# Patient Record
Sex: FEMALE | Race: WHITE | NOT HISPANIC OR LATINO | URBAN - METROPOLITAN AREA
[De-identification: names, ages, dates, MRNs, and addresses within clinical notes are randomized per-mention and may not be internally consistent; named-entity substitution may affect disease eponyms.]

---

## 2023-06-28 ENCOUNTER — HOSPITAL ENCOUNTER (INPATIENT)
Dept: DATA CONVERSION | Facility: HOSPITAL | Age: 63
Discharge: HOME | End: 2023-07-01
Attending: INTERNAL MEDICINE | Admitting: INTERNAL MEDICINE
Payer: COMMERCIAL

## 2023-06-28 DIAGNOSIS — N13.2 HYDRONEPHROSIS WITH RENAL AND URETERAL CALCULOUS OBSTRUCTION: Primary | ICD-10-CM

## 2023-06-28 LAB
ALBUMIN SERPL-MCNC: 4.3 GM/DL (ref 3.5–5)
ALBUMIN/GLOB SERPL: 1.4 RATIO (ref 1.5–3)
ALP BLD-CCNC: 90 U/L (ref 35–125)
ALT SERPL-CCNC: 19 U/L (ref 5–40)
ANION GAP SERPL CALCULATED.3IONS-SCNC: 13 MMOL/L (ref 0–19)
ANTICOAGULANT: NORMAL
APTT PPP: 25.2 SEC (ref 22–32.5)
APTT PPP: 26 SEC (ref 22–32.5)
AST SERPL-CCNC: 37 U/L (ref 5–40)
BASOPHILS # BLD AUTO: 0.05 K/UL (ref 0–0.22)
BASOPHILS NFR BLD AUTO: 0.4 % (ref 0–1)
BILIRUB SERPL-MCNC: 0.5 MG/DL (ref 0.1–1.2)
BUN SERPL-MCNC: 20 MG/DL (ref 8–25)
BUN/CREAT SERPL: 18.2 RATIO (ref 8–21)
CALCIUM SERPL-MCNC: 9.3 MG/DL (ref 8.5–10.4)
CHLORIDE SERPL-SCNC: 103 MMOL/L (ref 97–107)
CK SERPL-CCNC: 81 U/L (ref 24–195)
CO2 SERPL-SCNC: 22 MMOL/L (ref 24–31)
CREAT SERPL-MCNC: 1.1 MG/DL (ref 0.4–1.6)
DEPRECATED RDW RBC AUTO: 44.4 FL (ref 37–54)
DIFFERENTIAL METHOD BLD: ABNORMAL
EOSINOPHIL # BLD AUTO: 0.05 K/UL (ref 0–0.45)
EOSINOPHIL NFR BLD: 0.4 % (ref 0–3)
ERYTHROCYTE [DISTWIDTH] IN BLOOD BY AUTOMATED COUNT: 13.4 % (ref 11.7–15)
ETHANOL SERPL-MCNC: <0.01 GM/DL (ref 0–0.01)
GFR SERPL CREATININE-BSD FRML MDRD: 56 ML/MIN/1.73 M2
GLOBULIN SER-MCNC: 3 G/DL (ref 1.9–3.7)
GLUCOSE SERPL-MCNC: 192 MG/DL (ref 65–99)
HCT VFR BLD AUTO: 39.7 % (ref 36–44)
HGB BLD-MCNC: 13.5 GM/DL (ref 12–15)
HS TROPONIN T DELTA: 1 (ref 0–4)
HS TROPONIN T DELTA: NORMAL (ref 0–4)
IMM GRANULOCYTES # BLD AUTO: 0.06 K/UL (ref 0–0.1)
INR PPP: 1 (ref 0.86–1.16)
INR PPP: 1 (ref 0.86–1.16)
LIPASE SERPL-CCNC: 43 U/L (ref 16–63)
LYMPHOCYTES # BLD AUTO: 1.18 K/UL (ref 1.2–3.2)
LYMPHOCYTES NFR BLD MANUAL: 10 % (ref 20–40)
MCH RBC QN AUTO: 30.8 PG (ref 26–34)
MCHC RBC AUTO-ENTMCNC: 34 % (ref 31–37)
MCV RBC AUTO: 90.4 FL (ref 80–100)
MONOCYTES # BLD AUTO: 0.56 K/UL (ref 0–0.8)
MONOCYTES NFR BLD MANUAL: 4.8 % (ref 0–8)
NEUTROPHILS # BLD AUTO: 9.88 K/UL
NEUTROPHILS # BLD AUTO: 9.88 K/UL (ref 1.8–7.7)
NEUTROPHILS.IMMATURE NFR BLD: 0.5 % (ref 0–1)
NEUTS SEG NFR BLD: 83.9 % (ref 50–70)
NRBC BLD-RTO: 0 /100 WBC
NT-PROBNP SERPL-MCNC: 1339 PG/ML (ref 0–226)
PLATELET # BLD AUTO: 254 K/UL (ref 150–450)
PMV BLD AUTO: 10.3 CU (ref 7–12.6)
POTASSIUM SERPL-SCNC: 4.1 MMOL/L (ref 3.4–5.1)
PROT SERPL-MCNC: 7.3 G/DL (ref 5.9–7.9)
PROTHROMBIN TIME: 10.3 SEC (ref 9.3–12.7)
PROTHROMBIN TIME: 10.5 SEC (ref 9.3–12.7)
RBC # BLD AUTO: 4.39 M/UL (ref 4–4.9)
SODIUM SERPL-SCNC: 138 MMOL/L (ref 133–145)
TROPONIN T SERPL-MCNC: 11 NG/L
TROPONIN T SERPL-MCNC: 12 NG/L
WBC # BLD AUTO: 11.8 K/UL (ref 4.5–11)

## 2023-06-28 PROCEDURE — 99222 1ST HOSP IP/OBS MODERATE 55: CPT | Performed by: INTERNAL MEDICINE

## 2023-06-29 LAB
ANION GAP SERPL CALCULATED.3IONS-SCNC: 15 MMOL/L (ref 0–19)
APPEARANCE PLAS: CLEAR
BASOPHILS # BLD AUTO: 0.03 K/UL (ref 0–0.22)
BASOPHILS NFR BLD AUTO: 0.5 % (ref 0–1)
BUN SERPL-MCNC: 19 MG/DL (ref 8–25)
BUN/CREAT SERPL: 13.6 RATIO (ref 8–21)
CALCIUM SERPL-MCNC: 8.1 MG/DL (ref 8.5–10.4)
CHLORIDE SERPL-SCNC: 110 MMOL/L (ref 97–107)
CHOLEST SERPL-MCNC: 163 MG/DL (ref 133–200)
CHOLEST/HDLC SERPL: 4 RATIO
CO2 SERPL-SCNC: 19 MMOL/L (ref 24–31)
COLOR SPUN FLD: YELLOW
CREAT SERPL-MCNC: 1.4 MG/DL (ref 0.4–1.6)
DEPRECATED RDW RBC AUTO: 46.1 FL (ref 37–54)
DIFFERENTIAL METHOD BLD: ABNORMAL
EOSINOPHIL # BLD AUTO: 0.09 K/UL (ref 0–0.45)
EOSINOPHIL NFR BLD: 1.4 % (ref 0–3)
ERYTHROCYTE [DISTWIDTH] IN BLOOD BY AUTOMATED COUNT: 13.6 % (ref 11.7–15)
FASTING STATUS PATIENT QL REPORTED: ABNORMAL
GFR SERPL CREATININE-BSD FRML MDRD: 42 ML/MIN/1.73 M2
GLUCOSE SERPL-MCNC: 89 MG/DL (ref 65–99)
HCT VFR BLD AUTO: 39.5 % (ref 36–44)
HDLC SERPL-MCNC: 41 MG/DL
HGB BLD-MCNC: 12.6 GM/DL (ref 12–15)
IMM GRANULOCYTES # BLD AUTO: 0.03 K/UL (ref 0–0.1)
LDLC SERPL CALC-MCNC: 94 MG/DL (ref 65–130)
LYMPHOCYTES # BLD AUTO: 1.19 K/UL (ref 1.2–3.2)
LYMPHOCYTES NFR BLD MANUAL: 18.3 % (ref 20–40)
MCH RBC QN AUTO: 29.9 PG (ref 26–34)
MCHC RBC AUTO-ENTMCNC: 31.9 % (ref 31–37)
MCV RBC AUTO: 93.6 FL (ref 80–100)
MONOCYTES # BLD AUTO: 0.64 K/UL (ref 0–0.8)
MONOCYTES NFR BLD MANUAL: 9.8 % (ref 0–8)
NEUTROPHILS # BLD AUTO: 4.53 K/UL
NEUTROPHILS # BLD AUTO: 4.53 K/UL (ref 1.8–7.7)
NEUTROPHILS.IMMATURE NFR BLD: 0.5 % (ref 0–1)
NEUTS SEG NFR BLD: 69.5 % (ref 50–70)
NRBC BLD-RTO: 0 /100 WBC
PLATELET # BLD AUTO: 183 K/UL (ref 150–450)
PMV BLD AUTO: 10.9 CU (ref 7–12.6)
POTASSIUM SERPL-SCNC: 4 MMOL/L (ref 3.4–5.1)
RBC # BLD AUTO: 4.22 M/UL (ref 4–4.9)
SODIUM SERPL-SCNC: 144 MMOL/L (ref 133–145)
TRIGL SERPL-MCNC: 140 MG/DL (ref 40–150)
WBC # BLD AUTO: 6.5 K/UL (ref 4.5–11)

## 2023-06-29 PROCEDURE — 99232 SBSQ HOSP IP/OBS MODERATE 35: CPT | Performed by: INTERNAL MEDICINE

## 2023-06-30 LAB
ANION GAP SERPL CALCULATED.3IONS-SCNC: 14 MMOL/L (ref 0–19)
BASOPHILS # BLD AUTO: 0.04 K/UL (ref 0–0.22)
BASOPHILS NFR BLD AUTO: 0.8 % (ref 0–1)
BUN SERPL-MCNC: 14 MG/DL (ref 8–25)
BUN/CREAT SERPL: 14 RATIO (ref 8–21)
CALCIUM SERPL-MCNC: 8.4 MG/DL (ref 8.5–10.4)
CHLORIDE SERPL-SCNC: 111 MMOL/L (ref 97–107)
CO2 SERPL-SCNC: 18 MMOL/L (ref 24–31)
CREAT SERPL-MCNC: 1 MG/DL (ref 0.4–1.6)
DEPRECATED RDW RBC AUTO: 44.8 FL (ref 37–54)
DIFFERENTIAL METHOD BLD: ABNORMAL
EOSINOPHIL # BLD AUTO: 0.11 K/UL (ref 0–0.45)
EOSINOPHIL NFR BLD: 2.1 % (ref 0–3)
ERYTHROCYTE [DISTWIDTH] IN BLOOD BY AUTOMATED COUNT: 13.2 % (ref 11.7–15)
GFR SERPL CREATININE-BSD FRML MDRD: 63 ML/MIN/1.73 M2
GLUCOSE SERPL-MCNC: 88 MG/DL (ref 65–99)
HCT VFR BLD AUTO: 37.9 % (ref 36–44)
HGB BLD-MCNC: 12.4 GM/DL (ref 12–15)
IMM GRANULOCYTES # BLD AUTO: 0.02 K/UL (ref 0–0.1)
LYMPHOCYTES # BLD AUTO: 1.39 K/UL (ref 1.2–3.2)
LYMPHOCYTES NFR BLD MANUAL: 26.3 % (ref 20–40)
MCH RBC QN AUTO: 30.2 PG (ref 26–34)
MCHC RBC AUTO-ENTMCNC: 32.7 % (ref 31–37)
MCV RBC AUTO: 92.4 FL (ref 80–100)
MONOCYTES # BLD AUTO: 0.61 K/UL (ref 0–0.8)
MONOCYTES NFR BLD MANUAL: 11.5 % (ref 0–8)
NEUTROPHILS # BLD AUTO: 3.12 K/UL
NEUTROPHILS # BLD AUTO: 3.12 K/UL (ref 1.8–7.7)
NEUTROPHILS.IMMATURE NFR BLD: 0.4 % (ref 0–1)
NEUTS SEG NFR BLD: 58.9 % (ref 50–70)
NRBC BLD-RTO: 0 /100 WBC
PLATELET # BLD AUTO: 194 K/UL (ref 150–450)
PMV BLD AUTO: 10.3 CU (ref 7–12.6)
POTASSIUM SERPL-SCNC: 3.9 MMOL/L (ref 3.4–5.1)
RBC # BLD AUTO: 4.1 M/UL (ref 4–4.9)
SODIUM SERPL-SCNC: 143 MMOL/L (ref 133–145)
WBC # BLD AUTO: 5.3 K/UL (ref 4.5–11)

## 2023-06-30 PROCEDURE — 99232 SBSQ HOSP IP/OBS MODERATE 35: CPT | Performed by: INTERNAL MEDICINE

## 2023-07-01 LAB
ANION GAP SERPL CALCULATED.3IONS-SCNC: 13 MMOL/L (ref 0–19)
BASOPHILS # BLD AUTO: 0.04 K/UL (ref 0–0.22)
BASOPHILS NFR BLD AUTO: 0.8 % (ref 0–1)
BUN SERPL-MCNC: 14 MG/DL (ref 8–25)
BUN/CREAT SERPL: 15.6 RATIO (ref 8–21)
CALCIUM SERPL-MCNC: 8.7 MG/DL (ref 8.5–10.4)
CHLORIDE SERPL-SCNC: 109 MMOL/L (ref 97–107)
CO2 SERPL-SCNC: 18 MMOL/L (ref 24–31)
CREAT SERPL-MCNC: 0.9 MG/DL (ref 0.4–1.6)
DEPRECATED RDW RBC AUTO: 43.8 FL (ref 37–54)
DIFFERENTIAL METHOD BLD: ABNORMAL
EOSINOPHIL # BLD AUTO: 0.09 K/UL (ref 0–0.45)
EOSINOPHIL NFR BLD: 1.8 % (ref 0–3)
ERYTHROCYTE [DISTWIDTH] IN BLOOD BY AUTOMATED COUNT: 13.2 % (ref 11.7–15)
GFR SERPL CREATININE-BSD FRML MDRD: 72 ML/MIN/1.73 M2
GLUCOSE SERPL-MCNC: 92 MG/DL (ref 65–99)
HCT VFR BLD AUTO: 38.5 % (ref 36–44)
HGB BLD-MCNC: 12.8 GM/DL (ref 12–15)
IMM GRANULOCYTES # BLD AUTO: 0.03 K/UL (ref 0–0.1)
LYMPHOCYTES # BLD AUTO: 1.32 K/UL (ref 1.2–3.2)
LYMPHOCYTES NFR BLD MANUAL: 25.7 % (ref 20–40)
MCH RBC QN AUTO: 30.2 PG (ref 26–34)
MCHC RBC AUTO-ENTMCNC: 33.2 % (ref 31–37)
MCV RBC AUTO: 90.8 FL (ref 80–100)
MONOCYTES # BLD AUTO: 0.49 K/UL (ref 0–0.8)
MONOCYTES NFR BLD MANUAL: 9.5 % (ref 0–8)
NEUTROPHILS # BLD AUTO: 3.17 K/UL
NEUTROPHILS # BLD AUTO: 3.17 K/UL (ref 1.8–7.7)
NEUTROPHILS.IMMATURE NFR BLD: 0.6 % (ref 0–1)
NEUTS SEG NFR BLD: 61.6 % (ref 50–70)
NRBC BLD-RTO: 0 /100 WBC
PLATELET # BLD AUTO: 216 K/UL (ref 150–450)
PMV BLD AUTO: 10.5 CU (ref 7–12.6)
POTASSIUM SERPL-SCNC: 4.1 MMOL/L (ref 3.4–5.1)
RBC # BLD AUTO: 4.24 M/UL (ref 4–4.9)
SODIUM SERPL-SCNC: 140 MMOL/L (ref 133–145)
WBC # BLD AUTO: 5.1 K/UL (ref 4.5–11)

## 2023-07-01 PROCEDURE — 99238 HOSP IP/OBS DSCHRG MGMT 30/<: CPT | Performed by: INTERNAL MEDICINE

## 2023-07-07 ENCOUNTER — OFFICE VISIT (OUTPATIENT)
Dept: PRIMARY CARE | Facility: CLINIC | Age: 63
End: 2023-07-07
Payer: COMMERCIAL

## 2023-07-07 ENCOUNTER — APPOINTMENT (OUTPATIENT)
Dept: PRIMARY CARE | Facility: CLINIC | Age: 63
End: 2023-07-07
Payer: COMMERCIAL

## 2023-07-07 VITALS
SYSTOLIC BLOOD PRESSURE: 130 MMHG | BODY MASS INDEX: 27.11 KG/M2 | HEIGHT: 63 IN | DIASTOLIC BLOOD PRESSURE: 62 MMHG | WEIGHT: 153 LBS

## 2023-07-07 DIAGNOSIS — I48.92 ATRIAL FLUTTER, UNSPECIFIED TYPE (MULTI): Primary | ICD-10-CM

## 2023-07-07 DIAGNOSIS — N13.9 OBSTRUCTIVE UROPATHY: ICD-10-CM

## 2023-07-07 DIAGNOSIS — N20.0 KIDNEY STONE: ICD-10-CM

## 2023-07-07 DIAGNOSIS — Z12.11 ENCOUNTER FOR SCREENING COLONOSCOPY: ICD-10-CM

## 2023-07-07 DIAGNOSIS — K57.92 ACUTE DIVERTICULITIS: ICD-10-CM

## 2023-07-07 DIAGNOSIS — R53.83 FATIGUE, UNSPECIFIED TYPE: ICD-10-CM

## 2023-07-07 DIAGNOSIS — R00.2 PALPITATION: ICD-10-CM

## 2023-07-07 PROCEDURE — 99214 OFFICE O/P EST MOD 30 MIN: CPT | Performed by: INTERNAL MEDICINE

## 2023-07-07 PROCEDURE — 1036F TOBACCO NON-USER: CPT | Performed by: INTERNAL MEDICINE

## 2023-07-07 PROCEDURE — 93000 ELECTROCARDIOGRAM COMPLETE: CPT | Performed by: INTERNAL MEDICINE

## 2023-07-07 RX ORDER — PAROXETINE HYDROCHLORIDE 20 MG/1
20 TABLET, FILM COATED ORAL EVERY MORNING
COMMUNITY
Start: 2019-02-28

## 2023-07-07 RX ORDER — METOPROLOL TARTRATE 50 MG/1
50 TABLET ORAL 2 TIMES DAILY
COMMUNITY
Start: 2023-07-01

## 2023-07-07 RX ORDER — VALACYCLOVIR HYDROCHLORIDE 500 MG/1
500 TABLET, FILM COATED ORAL 3 TIMES DAILY
COMMUNITY
Start: 2019-02-28 | End: 2023-11-13 | Stop reason: WASHOUT

## 2023-07-07 RX ORDER — MULTIVITAMIN
1 TABLET ORAL
COMMUNITY
Start: 2020-12-23 | End: 2023-11-13 | Stop reason: WASHOUT

## 2023-07-07 RX ORDER — APIXABAN 5 MG/1
5 TABLET, FILM COATED ORAL 2 TIMES DAILY
COMMUNITY
Start: 2023-07-01

## 2023-07-07 RX ORDER — HYDROGEN PEROXIDE 3 %
20 SOLUTION, NON-ORAL MISCELLANEOUS
COMMUNITY
Start: 2019-02-28

## 2023-07-07 RX ORDER — LANOLIN ALCOHOL/MO/W.PET/CERES
1000 CREAM (GRAM) TOPICAL DAILY
COMMUNITY
Start: 2019-04-09

## 2023-07-07 RX ORDER — TAMSULOSIN HYDROCHLORIDE 0.4 MG/1
0.4 CAPSULE ORAL DAILY
COMMUNITY
Start: 2023-07-02 | End: 2023-11-13 | Stop reason: WASHOUT

## 2023-07-12 PROBLEM — R00.2 PALPITATION: Status: ACTIVE | Noted: 2023-07-12

## 2023-07-12 PROBLEM — I48.92 ATRIAL FLUTTER (MULTI): Status: ACTIVE | Noted: 2023-07-12

## 2023-07-12 PROBLEM — N13.9 OBSTRUCTIVE UROPATHY: Status: ACTIVE | Noted: 2023-07-12

## 2023-07-12 PROBLEM — N20.0 KIDNEY STONE: Status: ACTIVE | Noted: 2023-07-12

## 2023-07-12 NOTE — PROGRESS NOTES
Subjective   Patient ID: Caroline Robles is a 62 y.o. female who presents for Hospital Follow-up.    HPI   Patient is here for hospital follow-up  She was admitted for obstructive uropathy acute diverticulitis and also went into atrial flutter.  Patient has history of SVT in the past had 2 ablations but this time she was in atrial flutter.  Started on high dose of beta-blocker and anticoagulants  Patient did make follow-up with her EPS cardiologist  Her symptoms of acute diverticulitis have resolved  She is not having any pain from the kidney stone which is expected to pass on its own   Patient is still feeling wiped out her legs feel very weak shaky she gets out of breath      patient is here with complaints of having head congestion sinus congestion cough postnasal drainage  3 COVID test negative  Patient is treated for cardiomyopathy and SVT        past recap  Patient is here for hospital follow-up  In June she fell and broke her right patella  Wound needed debridement she also had left ulna and radius fracture  Fall happened in her driveway where her shoe got caught in uneven surface  She is able to walk now weightbearing  She is using immobilizer but no more using the walker  She had her third cast change next she is working with OT for shower with assistance     Patient is here for follow-up  He was in the hospital again for SVT  She has a follow-up coming up with her cardiologist to discuss more options because she had a failed ablation  She is doing blood work before her appointment  She needs refills on Valtrex she has history of genital herpes and she has been on maintenance therapy for a long time and she is not able to the medication  Her son got diagnosed with Covid but he is not seen her for 2 weeks  She is not doing any more iron infusions  She is off her breast cancer medication because she was having terrible hot flashes  She is also taken off the digoxin by the cardiologist  Also complaining of  "burning sensation on the back of the right leg  She does sit a lot at work        Social history non-smoker nondrinker     past hx  Patient here for follow-up on blood work continues to have bilateral foot pain and knee pain  Her morning stiffness foot and ankle pain started after she got iron transfusion fibrillation some month ago  She's also feeling numbness and tingling in both the legs  She finished antibiotic still feels some fullness in the neck throat is feeling much better sinuses feeling better     Patient has a cardiac ablation done but it failed  She had iron transfusion  She was started on digoxin but then patient developed swelling and pain in the right knee and the ankle so she quit taking digoxin and taking Motrin but seems to be helping a little  She is here to get her right knee checked     Patient here for follow-up on CAT scan of the chest   She continues to have episodes of cough  Sometimes she gets quite nauseous that she throws up  On February 21 she is scheduled for cardiac ablation  Cardiologist does not think that shortness of breath is related to heart     PMH   Patient has history of breast cancer  Patient lost her  has moved from the [place] to [facility]   Weeks ago patient was [facility] for SVT  History of breast cancer status post left lumpectomy  She is under care of electrophysiologist cardiologist for arrhythmia   Review of Systems    Objective   /62   Ht 1.6 m (5' 3\")   Wt 69.4 kg (153 lb)   BMI 27.10 kg/m²     Physical Exam  Vitals reviewed.   Constitutional:       Appearance: Normal appearance.   HENT:      Head: Normocephalic and atraumatic.      Right Ear: Tympanic membrane, ear canal and external ear normal.      Left Ear: Tympanic membrane, ear canal and external ear normal.      Nose: Nose normal.      Mouth/Throat:      Pharynx: Oropharynx is clear.   Eyes:      Extraocular Movements: Extraocular movements intact.      Conjunctiva/sclera: Conjunctivae " normal.      Pupils: Pupils are equal, round, and reactive to light.   Cardiovascular:      Rate and Rhythm: Normal rate and regular rhythm.      Pulses: Normal pulses.      Heart sounds: Normal heart sounds.   Pulmonary:      Effort: Pulmonary effort is normal.      Breath sounds: Normal breath sounds.   Abdominal:      General: Abdomen is flat. Bowel sounds are normal.      Palpations: Abdomen is soft.   Musculoskeletal:      Cervical back: Normal range of motion and neck supple.   Skin:     General: Skin is warm and dry.   Neurological:      General: No focal deficit present.      Mental Status: She is alert and oriented to person, place, and time.   Psychiatric:         Mood and Affect: Mood normal.         Assessment/Plan   Problem List Items Addressed This Visit          Cardiac and Vasculature    Atrial flutter (CMS/HCC) - Primary    Relevant Medications    metoprolol tartrate (Lopressor) 50 mg tablet    Palpitation    Relevant Orders    ECG 12 lead       Genitourinary and Reproductive    Kidney stone    Obstructive uropathy     Other Visit Diagnoses       Encounter for screening colonoscopy        Relevant Orders    Colonoscopy    Acute diverticulitis        Fatigue, unspecified type              past recap  I'm wondering if patient had side effects to the IV iron treatment or possible viral symptoms related to her sickness or the anesthesia she was through for her heart procedure  She is definitely looking better feeling better her blood work has improved  Will repeat her CBC CMP iron studies before giving her next iron infusion  Will also check cholesterol in 3 months  Will repeat KILO titers  Continue with exercise program     11/12  Burning pain on the right leg most likely from neuropathy  Advised patient to move around more and see if the pain gets better  Valtrex prescription given patient does not want to go off the medication  Screening colonoscopy she is due for ordered  Bone density ordered  Her  heart rate is fine today  We will order blood work for cholesterol  Anemia has resolved no need for IV iron  Follow-up in 6 months     8/18  Clinically appears patient is healing well  Continue PT OT  Home care orders ordered  Blood pressure stable  Continue current medications  Follow-up after blood work     10/14/22  Treat with Z-Bean steam saline gargles rest fluids  Tessalon Perles  Follow-up if not better     7/8/2023  EKG done shows normal sinus rhythm  Symptoms of kidney stone and diverticulitis has resolved  Refer to colonoscopy  Fatigue could be related to the heart issue  Her ejection fraction was reduced to 45%  Follow-up with the cardiologist  If it still not better will do further work-up  Follow-up in a month

## 2023-07-29 LAB
ANION GAP IN SER/PLAS: 15 MMOL/L (ref 10–20)
CALCIUM (MG/DL) IN SER/PLAS: 9.1 MG/DL (ref 8.6–10.6)
CARBON DIOXIDE, TOTAL (MMOL/L) IN SER/PLAS: 24 MMOL/L (ref 21–32)
CHLORIDE (MMOL/L) IN SER/PLAS: 106 MMOL/L (ref 98–107)
CREATININE (MG/DL) IN SER/PLAS: 0.7 MG/DL (ref 0.5–1.05)
GFR FEMALE: >90 ML/MIN/1.73M2
GLUCOSE (MG/DL) IN SER/PLAS: 86 MG/DL (ref 74–99)
POTASSIUM (MMOL/L) IN SER/PLAS: 4.6 MMOL/L (ref 3.5–5.3)
SODIUM (MMOL/L) IN SER/PLAS: 140 MMOL/L (ref 136–145)
UREA NITROGEN (MG/DL) IN SER/PLAS: 14 MG/DL (ref 6–23)

## 2023-11-10 ENCOUNTER — APPOINTMENT (OUTPATIENT)
Dept: RADIOLOGY | Facility: CLINIC | Age: 63
End: 2023-11-10
Payer: COMMERCIAL

## 2023-11-10 PROBLEM — K44.9 HIATAL HERNIA: Status: ACTIVE | Noted: 2023-11-10

## 2023-11-10 PROBLEM — M94.9 DISORDER OF BONE AND ARTICULAR CARTILAGE: Status: ACTIVE | Noted: 2023-11-10
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
## 2023-11-10 PROBLEM — M25.642 STIFFNESS OF LEFT HAND JOINT: Status: ACTIVE | Noted: 2023-11-10

## 2023-11-10 PROBLEM — R53.83 FATIGUE: Status: ACTIVE | Noted: 2023-11-10

## 2023-11-10 PROBLEM — L30.4 ERYTHEMA INTERTRIGO: Status: ACTIVE | Noted: 2018-05-31

## 2023-11-10 PROBLEM — E53.8 VITAMIN B 12 DEFICIENCY: Status: ACTIVE | Noted: 2023-11-10

## 2023-11-10 PROBLEM — E78.5 HYPERLIPIDEMIA: Status: ACTIVE | Noted: 2023-11-10

## 2023-11-10 PROBLEM — D53.9 ANEMIA ASSOCIATED WITH NUTRITIONAL DEFICIENCY: Status: ACTIVE | Noted: 2023-11-10

## 2023-11-10 PROBLEM — J20.9 ACUTE BRONCHITIS: Status: ACTIVE | Noted: 2023-11-10

## 2023-11-10 PROBLEM — R20.0 NUMBNESS AND TINGLING: Status: ACTIVE | Noted: 2023-11-10

## 2023-11-10 PROBLEM — E11.40 DIABETES MELLITUS WITH NEUROPATHY (MULTI): Status: ACTIVE | Noted: 2023-11-10

## 2023-11-10 PROBLEM — R42 DIZZINESS: Status: ACTIVE | Noted: 2023-11-10

## 2023-11-10 PROBLEM — S52.209A ULNAR FRACTURE: Status: ACTIVE | Noted: 2023-11-10

## 2023-11-10 PROBLEM — K76.0 NONALCOHOLIC FATTY LIVER DISEASE: Status: ACTIVE | Noted: 2023-11-10

## 2023-11-10 PROBLEM — D72.819 LEUKOPENIA: Status: ACTIVE | Noted: 2023-11-10

## 2023-11-10 PROBLEM — K21.9 GERD (GASTROESOPHAGEAL REFLUX DISEASE): Status: ACTIVE | Noted: 2023-11-10

## 2023-11-10 PROBLEM — M54.50 ACUTE LOW BACK PAIN: Status: ACTIVE | Noted: 2023-11-10

## 2023-11-10 PROBLEM — L82.1 OTHER SEBORRHEIC KERATOSIS: Status: ACTIVE | Noted: 2018-05-31

## 2023-11-10 PROBLEM — G47.00 INSOMNIA: Status: ACTIVE | Noted: 2023-11-10

## 2023-11-10 PROBLEM — I48.0 PAROXYSMAL ATRIAL FIBRILLATION (MULTI): Status: ACTIVE | Noted: 2021-06-23

## 2023-11-10 PROBLEM — L81.4 OTHER MELANIN HYPERPIGMENTATION: Status: ACTIVE | Noted: 2018-05-31

## 2023-11-10 PROBLEM — M19.90 ARTHRITIS: Status: ACTIVE | Noted: 2023-11-10

## 2023-11-10 PROBLEM — R20.2 NUMBNESS AND TINGLING: Status: ACTIVE | Noted: 2023-11-10

## 2023-11-10 PROBLEM — K57.92 DIVERTICULITIS: Status: ACTIVE | Noted: 2023-11-10

## 2023-11-10 PROBLEM — F41.9 ANXIETY AND DEPRESSION: Status: ACTIVE | Noted: 2023-11-10

## 2023-11-10 PROBLEM — R35.0 URINE FREQUENCY: Status: ACTIVE | Noted: 2023-11-10

## 2023-11-10 PROBLEM — N20.1 CALCULUS OF LOWER THIRD OF URETER: Status: ACTIVE | Noted: 2023-11-10

## 2023-11-10 PROBLEM — R05.9 COUGH: Status: ACTIVE | Noted: 2023-11-10

## 2023-11-10 PROBLEM — N39.0 UTI (URINARY TRACT INFECTION): Status: ACTIVE | Noted: 2023-11-10

## 2023-11-10 PROBLEM — R68.89 CHANGE IN WEIGHT: Status: ACTIVE | Noted: 2023-11-10

## 2023-11-10 PROBLEM — R07.9 CHEST PAIN: Status: ACTIVE | Noted: 2023-11-10

## 2023-11-10 PROBLEM — L91.8 OTHER HYPERTROPHIC DISORDERS OF THE SKIN: Status: ACTIVE | Noted: 2018-05-31
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
## 2023-11-10 PROBLEM — R06.02 SOB (SHORTNESS OF BREATH): Status: ACTIVE | Noted: 2023-11-10

## 2023-11-10 PROBLEM — M79.673 FOOT PAIN: Status: ACTIVE | Noted: 2023-11-10

## 2023-11-10 PROBLEM — C50.919 MALIGNANT NEOPLASM OF BREAST (MULTI): Status: ACTIVE | Noted: 2023-11-10

## 2023-11-10 PROBLEM — S82.001A RIGHT PATELLA FRACTURE: Status: ACTIVE | Noted: 2023-11-10

## 2023-11-10 PROBLEM — D17.9 LIPOMA: Status: ACTIVE | Noted: 2023-11-10

## 2023-11-10 PROBLEM — M25.539 WRIST PAIN: Status: ACTIVE | Noted: 2023-11-10

## 2023-11-10 PROBLEM — D18.01 HEMANGIOMA OF SKIN AND SUBCUTANEOUS TISSUE: Status: ACTIVE | Noted: 2018-05-31

## 2023-11-10 PROBLEM — M89.9 DISORDER OF BONE AND ARTICULAR CARTILAGE: Status: ACTIVE | Noted: 2023-11-10

## 2023-11-10 PROBLEM — B00.9 HERPES: Status: ACTIVE | Noted: 2023-11-10

## 2023-11-10 PROBLEM — R92.8 ABNORMAL MAMMOGRAM OF LEFT BREAST: Status: ACTIVE | Noted: 2023-11-10

## 2023-11-10 PROBLEM — S52.502D CLOSED FRACTURE OF LOWER END OF LEFT RADIUS WITH ROUTINE HEALING: Status: ACTIVE | Noted: 2023-11-10

## 2023-11-10 PROBLEM — D22.5 MELANOCYTIC NEVI OF TRUNK: Status: ACTIVE | Noted: 2018-05-31

## 2023-11-10 PROBLEM — R79.89 ELEVATED LFTS: Status: ACTIVE | Noted: 2023-11-10

## 2023-11-10 PROBLEM — K59.00 CONSTIPATION: Status: ACTIVE | Noted: 2023-11-10

## 2023-11-10 PROBLEM — L90.5 SCAR CONDITION AND FIBROSIS OF SKIN: Status: ACTIVE | Noted: 2018-05-31

## 2023-11-10 PROBLEM — M25.50 ARTHRALGIA OF MULTIPLE SITES: Status: ACTIVE | Noted: 2023-11-10

## 2023-11-10 PROBLEM — I42.8 CARDIOMYOPATHY, NONISCHEMIC (MULTI): Status: ACTIVE | Noted: 2023-11-10

## 2023-11-10 PROBLEM — D23.9 OTHER BENIGN NEOPLASM OF SKIN, UNSPECIFIED: Status: ACTIVE | Noted: 2018-05-31

## 2023-11-10 PROBLEM — J01.90 ACUTE SINUSITIS: Status: ACTIVE | Noted: 2023-11-10

## 2023-11-10 PROBLEM — R31.9 HEMATURIA: Status: ACTIVE | Noted: 2023-11-10

## 2023-11-10 PROBLEM — L72.3 SEBACEOUS CYST: Status: ACTIVE | Noted: 2018-05-31

## 2023-11-10 PROBLEM — R11.2 NAUSEA & VOMITING: Status: ACTIVE | Noted: 2023-11-10

## 2023-11-10 PROBLEM — F32.A ANXIETY AND DEPRESSION: Status: ACTIVE | Noted: 2023-11-10

## 2023-11-10 PROBLEM — I10 HYPERTENSION: Status: ACTIVE | Noted: 2023-11-10

## 2023-11-10 PROBLEM — D05.12 DUCTAL CARCINOMA IN SITU (DCIS) OF LEFT BREAST: Status: ACTIVE | Noted: 2023-11-10

## 2023-11-10 PROBLEM — R92.0 ABNORMAL MAMMOGRAM WITH MICROCALCIFICATION: Status: ACTIVE | Noted: 2023-11-10

## 2023-11-10 RX ORDER — KETOCONAZOLE 20 MG/G
CREAM TOPICAL
COMMUNITY
Start: 2018-05-18 | End: 2023-11-13 | Stop reason: WASHOUT

## 2023-11-10 NOTE — PROGRESS NOTES
Caroline Robles female   1960 63 y.o.   [id number]      Chief Complaint    Follow-up          [facility]  Caroline Robles is a 63 y.o.   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 17. 2017 Dr Ramírez performed left lumpectomy, negative margins, 1mm to inferior margin. She complete radiation therapy. She was on Anastrozole from 10/2017- 10/2021(4 years), stopping early due to vasomotor hot flashes.  Stage pTis, 0    BREAST IMAGIN2022 screening mammogram, BI-RADS Category 2. No breast MRIs performed.     REPRODUCTIVE HISTORY: Menarche age 15, , first birth aage [age], menopause age 45, scatter breast tissue    FAMILY CANCER HISTORY:  Sister: pancreatic cancer          REVIEW OF SYSTEMS    Constitutional:  Negative for appetite change, fatigue, fever and unexpected weight change.   HENT:  Negative for ear pain, hearing loss, nosebleeds, sore throat and trouble swallowing.    Eyes:  Negative for discharge, itching and visual disturbance.   Respiratory:  Negative for cough, chest tightness and shortness of breath.    Cardiovascular:  Negative for chest pain, palpitations and leg swelling.   Breast: as indicated in HPI  Gastrointestinal:  Negative for abdominal pain, constipation, diarrhea and nausea.   Endocrine: Negative for cold intolerance and heat intolerance.   Genitourinary:  Negative for dysuria, frequency, hematuria, pelvic pain and vaginal bleeding.   Musculoskeletal:  Negative for arthralgias, back pain, gait problem, joint swelling and myalgias.   Skin:  Negative for color change and rash.   Allergic/Immunologic: Negative for environmental allergies and food allergies.   Neurological:  Negative for dizziness, tremors, speech difficulty, weakness, numbness and headaches.   Hematological:  Does not bruise/bleed easily.   Psychiatric/Behavioral:  Negative for agitation, dysphoric mood and sleep  disturbance. The patient is not nervous/anxious.         MEDICATIONS  Current Outpatient Medications   Medication Instructions    cyanocobalamin (VITAMIN B-12) 1,000 mcg, oral, Daily    Eliquis 5 mg, oral, 2 times daily    esomeprazole (NEXIUM) 20 mg, oral, Daily before breakfast    metoprolol tartrate (LOPRESSOR) 50 mg, oral, 2 times daily    PARoxetine (PAXIL) 20 mg, oral, Every morning        ALLERGIES  No Known Allergies     SOCIAL HISTORY    Family History   Problem Relation Name Age of Onset    Other (Cardiac disorder) Mother      Lung cancer Father      Alcohol abuse Other Family history     Cancer Other Family history     Pancreatic cancer Sibling           Social History     Tobacco Use    Smoking status: Never    Smokeless tobacco: Never   Substance Use Topics    Alcohol use: Never        VITALS  Vitals:    11/13/23 1447   BP: 100/66   Pulse: 82   Resp: 16   Temp: 36.8 °C (98.2 °F)   SpO2: 97%        PHYSICAL EXAM  Patient is alert and oriented x3, with appropriate mood. The gait is steady and hand grasps are equal. Sclera clear. The left breast with healed partial circumareolar incision and 2cm palpable seroma in the central medial lateral breast. The tissue is soft without palpable abnormalities, discrete nodules or masses. The skin and nipples appear normal. There is no cervical, supraclavicular, or axillary lymphadenopathy palpable. Heart rate and rhythm normal, S1 and S2 appreciated. The lungs are clear bilaterally. Abdomen is soft & non-tender.    Physical Exam  Chest:              IMAGING  BI mammo bilateral diagnostic tomosynthesis 11/13/2023    Narrative  Interpreted By:  Angella Iraheta,  STUDY:  BI MAMMO BILATERAL DIAGNOSTIC TOMOSYNTHESIS;  11/13/2023 2:31 pm    ACCESSION NUMBER(S):  [id number]    ORDERING CLINICIAN:  RACHEL ROMEO    INDICATION:  History of left breast cancer status post lumpectomy and radiation.  Annual mammogram.    COMPARISON:  11/08/2022, 10/05/2021, 09/29/2020,  09/24/2019.    FINDINGS:  2D and tomosynthesis images were reviewed at 1 mm slice thickness.    Density:  The breast tissue is heterogeneously dense, which may  obscure small masses.    Postoperative scarring, surgical clips, and a postoperative seroma  are seen in the central medial left breast at anterior depth. No  suspicious masses or calcifications are identified.    Impression  No mammographic evidence of malignancy.  Posttreatment changes of the  left breast.    BI-RADS Category:  2 Benign.  Recommendation:  Routine Screening Mammogram in 1 Year.  Recommended Date:  1 Year.  Laterality:  Bilateral.        Time was spent viewing digital images of the radiology testing with the patient. I explained the results in depth, along with suggested explanation for follow up recommendations based on the testing results.          ORDERS  Orders Placed This Encounter   Procedures    BI mammo bilateral screening tomosynthesis     Standing Status:   Future     Standing Expiration Date:   1/10/2025     Order Specific Question:   Perform a breast ultrasound if clinically indicated by Radiologist?     Answer:   Yes     Order Specific Question:   Previous Mamm performed at [facility] location?     Answer:   Yes     Order Specific Question:   Reason for exam:     Answer:   clinic screen, history oleft DCIS     Order Specific Question:   Radiologist to Determine Optimal Study     Answer:   Yes     Order Specific Question:   Release result to [address]     Answer:   Immediate     Order Specific Question:   Is this exam part of a Research Study? If Yes, link this order to the research study     Answer:   No           ASSESSMENT/PLAN  1. Encounter for follow-up surveillance of breast cancer        2. Healthcare maintenance  BI mammo bilateral screening tomosynthesis           Follow up return PCP.  Thank you for coming to see me today at [facility]. Your clinical examination and imaging is normal. You no longer need to  be seen by a surgical breast specialist. It is important to continue annual screening mammograms & clinical breast exams. Please return to see me if you have a new breast problem or abnormal mammogram. It has been a pleasure having you as a patient.     You can see your health information, review clinical summaries from office visits & test results online when you follow your health with MY  Chart, a personal health record. To sign up go to [email or web address]. If you need assistance with signing up or trouble getting into your account call [employer] Patient Line 24/7 at [phone].     My office phone number is [phone] if you need to get in touch with me or have additional questions or problems. Thank you for choosing [facility] and trusting me as your healthcare provider. I am honored to be a provider on your health care team and I remain dedicated to helping you achieve your health goals.         Anastasia Rojas, [facility] [place]

## 2023-11-13 ENCOUNTER — OFFICE VISIT (OUTPATIENT)
Dept: SURGICAL ONCOLOGY | Facility: CLINIC | Age: 63
End: 2023-11-13
Payer: COMMERCIAL

## 2023-11-13 ENCOUNTER — ANCILLARY PROCEDURE (OUTPATIENT)
Dept: RADIOLOGY | Facility: CLINIC | Age: 63
End: 2023-11-13
Payer: COMMERCIAL

## 2023-11-13 VITALS
BODY MASS INDEX: 26.38 KG/M2 | WEIGHT: 154.54 LBS | SYSTOLIC BLOOD PRESSURE: 100 MMHG | RESPIRATION RATE: 16 BRPM | DIASTOLIC BLOOD PRESSURE: 66 MMHG | TEMPERATURE: 98.2 F | HEIGHT: 64 IN | HEART RATE: 82 BPM | OXYGEN SATURATION: 97 %

## 2023-11-13 VITALS — BODY MASS INDEX: 27.11 KG/M2 | HEIGHT: 63 IN | WEIGHT: 153 LBS

## 2023-11-13 DIAGNOSIS — Z85.3 ENCOUNTER FOR FOLLOW-UP SURVEILLANCE OF BREAST CANCER: Primary | ICD-10-CM

## 2023-11-13 DIAGNOSIS — Z08 ENCOUNTER FOR FOLLOW-UP SURVEILLANCE OF BREAST CANCER: Primary | ICD-10-CM

## 2023-11-13 DIAGNOSIS — Z85.3 PERSONAL HISTORY OF MALIGNANT NEOPLASM OF BREAST: ICD-10-CM

## 2023-11-13 DIAGNOSIS — Z00.00 HEALTHCARE MAINTENANCE: ICD-10-CM

## 2023-11-13 DIAGNOSIS — Z08 ENCOUNTER FOR FOLLOW-UP EXAMINATION AFTER COMPLETED TREATMENT FOR MALIGNANT NEOPLASM: ICD-10-CM

## 2023-11-13 PROCEDURE — 99213 OFFICE O/P EST LOW 20 MIN: CPT | Mod: 25 | Performed by: NURSE PRACTITIONER

## 2023-11-13 PROCEDURE — 99213 OFFICE O/P EST LOW 20 MIN: CPT | Performed by: NURSE PRACTITIONER

## 2023-11-13 PROCEDURE — 77066 DX MAMMO INCL CAD BI: CPT

## 2023-11-13 PROCEDURE — 1036F TOBACCO NON-USER: CPT | Performed by: NURSE PRACTITIONER

## 2023-11-13 PROCEDURE — 77062 BREAST TOMOSYNTHESIS BI: CPT | Performed by: STUDENT IN AN ORGANIZED HEALTH CARE EDUCATION/TRAINING PROGRAM

## 2023-11-13 PROCEDURE — 3074F SYST BP LT 130 MM HG: CPT | Performed by: NURSE PRACTITIONER

## 2023-11-13 PROCEDURE — 77066 DX MAMMO INCL CAD BI: CPT | Performed by: STUDENT IN AN ORGANIZED HEALTH CARE EDUCATION/TRAINING PROGRAM

## 2023-11-13 PROCEDURE — 3078F DIAST BP <80 MM HG: CPT | Performed by: NURSE PRACTITIONER

## 2023-11-13 ASSESSMENT — PAIN SCALES - GENERAL: PAINLEVEL: 0-NO PAIN

## 2023-11-14 ENCOUNTER — OFFICE VISIT (OUTPATIENT)
Dept: PRIMARY CARE | Facility: CLINIC | Age: 63
End: 2023-11-14
Payer: COMMERCIAL

## 2023-11-14 VITALS
WEIGHT: 153.8 LBS | BODY MASS INDEX: 26.26 KG/M2 | SYSTOLIC BLOOD PRESSURE: 122 MMHG | HEIGHT: 64 IN | DIASTOLIC BLOOD PRESSURE: 78 MMHG

## 2023-11-14 DIAGNOSIS — K21.9 GASTROESOPHAGEAL REFLUX DISEASE WITHOUT ESOPHAGITIS: ICD-10-CM

## 2023-11-14 DIAGNOSIS — I42.8 CARDIOMYOPATHY, NONISCHEMIC (MULTI): Primary | ICD-10-CM

## 2023-11-14 DIAGNOSIS — B00.9 HERPES: ICD-10-CM

## 2023-11-14 DIAGNOSIS — I48.0 PAROXYSMAL ATRIAL FIBRILLATION (MULTI): ICD-10-CM

## 2023-11-14 DIAGNOSIS — I10 PRIMARY HYPERTENSION: ICD-10-CM

## 2023-11-14 PROCEDURE — 3078F DIAST BP <80 MM HG: CPT | Performed by: INTERNAL MEDICINE

## 2023-11-14 PROCEDURE — 1036F TOBACCO NON-USER: CPT | Performed by: INTERNAL MEDICINE

## 2023-11-14 PROCEDURE — 99214 OFFICE O/P EST MOD 30 MIN: CPT | Performed by: INTERNAL MEDICINE

## 2023-11-14 PROCEDURE — 3074F SYST BP LT 130 MM HG: CPT | Performed by: INTERNAL MEDICINE

## 2023-11-14 RX ORDER — VALACYCLOVIR HYDROCHLORIDE 500 MG/1
500 TABLET, FILM COATED ORAL DAILY
Qty: 90 TABLET | Refills: 3 | Status: SHIPPED | OUTPATIENT
Start: 2023-11-14

## 2023-11-14 ASSESSMENT — ENCOUNTER SYMPTOMS
DEPRESSION: 0
OCCASIONAL FEELINGS OF UNSTEADINESS: 0
LOSS OF SENSATION IN FEET: 0

## 2023-11-14 NOTE — PROGRESS NOTES
Subjective   Patient ID: Caroline Robles is a 63 y.o. female who presents for Follow-up (Glands swollen, ear pain).    HPI   Patient is here for follow-up   She had her follow-up with the cardiologist and he started her on half dose of lisinopril 2.5 mg to help with her cardiomyopathy.  She did 2D echo results are pending  She had colonoscopy done which is negative  Her mammogram is normal  She is going on a cruise and worried about seasickness  She wants prescription for shingles vaccine otherwise overall she is feeling   Patient is complaining of pain in the left side of the neck and ear for the past few days today it feels little better sharp pain  She is also asking for Wegovy as it is approved for the cardiac indication and with her cardiomyopathy if she will be a candidate because she would like to lose some weight    Past recap    patient is here for hospital follow-up  She was admitted for obstructive uropathy acute diverticulitis and also went into atrial flutter.  Patient has history of SVT in the past had 2 ablations but this time she was in atrial flutter.  Started on high dose of beta-blocker and anticoagulants  Patient did make follow-up with her EPS cardiologist  Her symptoms of acute diverticulitis have resolved  She is not having any pain from the kidney stone which is expected to pass on its own   Patient is still feeling wiped out her legs feel very weak shaky she gets out of breath    patient is here with complaints of having head congestion sinus congestion cough postnasal drainage  3 COVID test negative  Patient is treated for cardiomyopathy and SVT     Patient is here for hospital follow-up  In June she fell and broke her right patella  Wound needed debridement she also had left ulna and radius fracture  Fall happened in her driveway where her shoe got caught in uneven surface  She is able to walk now weightbearing  She is using immobilizer but no more using the walker  She had her third cast  change next she is working with OT for shower with assistance     Patient is here for follow-up  He was in the hospital again for SVT  She has a follow-up coming up with her cardiologist to discuss more options because she had a failed ablation  She is doing blood work before her appointment  She needs refills on Valtrex she has history of genital herpes and she has been on maintenance therapy for a long time and she is not able to the medication  Her son got diagnosed with Covid but he is not seen her for 2 weeks  She is not doing any more iron infusions  She is off her breast cancer medication because she was having terrible hot flashes  She is also taken off the digoxin by the cardiologist  Also complaining of burning sensation on the back of the right leg  She does sit a lot at work        Social history non-smoker nondrinker     past hx  Patient here for follow-up on blood work continues to have bilateral foot pain and knee pain  Her morning stiffness foot and ankle pain started after she got iron transfusion fibrillation some month ago  She's also feeling numbness and tingling in both the legs  She finished antibiotic still feels some fullness in the neck throat is feeling much better sinuses feeling better     Patient has a cardiac ablation done but it failed  She had iron transfusion  She was started on digoxin but then patient developed swelling and pain in the right knee and the ankle so she quit taking digoxin and taking Motrin but seems to be helping a little  She is here to get her right knee checked     Patient here for follow-up on CAT scan of the chest   She continues to have episodes of cough  Sometimes she gets quite nauseous that she throws up  On February 21 she is scheduled for cardiac ablation  Cardiologist does not think that shortness of breath is related to heart     PMH   Patient has history of breast cancer  Patient lost her  has moved from the [place] to [facility]   Weeks ago  "patient was [facility] for T  History of breast cancer status post left lumpectomy  She is under care of electrophysiologist cardiologist for arrhythmia   Review of Systems    Objective   /78   Ht 1.623 m (5' 3.9\")   Wt 69.8 kg (153 lb 12.8 oz)   BMI 26.48 kg/m²     Physical Exam  Vitals reviewed.   Constitutional:       Appearance: Normal appearance.   HENT:      Head: Normocephalic and atraumatic.      Right Ear: Tympanic membrane, ear canal and external ear normal.      Left Ear: Tympanic membrane, ear canal and external ear normal.      Nose: Nose normal.      Mouth/Throat:      Pharynx: Oropharynx is clear.   Eyes:      Extraocular Movements: Extraocular movements intact.      Conjunctiva/sclera: Conjunctivae normal.      Pupils: Pupils are equal, round, and reactive to light.   Cardiovascular:      Rate and Rhythm: Normal rate and regular rhythm.      Pulses: Normal pulses.      Heart sounds: Normal heart sounds.   Pulmonary:      Effort: Pulmonary effort is normal.      Breath sounds: Normal breath sounds.   Abdominal:      General: Abdomen is flat. Bowel sounds are normal.      Palpations: Abdomen is soft.   Musculoskeletal:      Cervical back: Normal range of motion and neck supple.   Skin:     General: Skin is warm and dry.   Neurological:      General: No focal deficit present.      Mental Status: She is alert and oriented to person, place, and time.   Psychiatric:         Mood and Affect: Mood normal.         Assessment/Plan   Problem List Items Addressed This Visit          Cardiac and Vasculature    Cardiomyopathy, nonischemic (CMS/HCC) - Primary    Hypertension    Paroxysmal atrial fibrillation (CMS/HCC)       Gastrointestinal and Abdominal    GERD (gastroesophageal reflux disease)       Infectious Diseases    Herpes    Relevant Medications    valACYclovir (Valtrex) 500 mg tablet     past recap  I'm wondering if patient had side effects to the IV iron treatment or possible viral symptoms " related to her sickness or the anesthesia she was through for her heart procedure  She is definitely looking better feeling better her blood work has improved  Will repeat her CBC CMP iron studies before giving her next iron infusion  Will also check cholesterol in 3 months  Will repeat KILO titers  Continue with exercise program     11/12  Burning pain on the right leg most likely from neuropathy  Advised patient to move around more and see if the pain gets better  Valtrex prescription given patient does not want to go off the medication  Screening colonoscopy she is due for ordered  Bone density ordered  Her heart rate is fine today  We will order blood work for cholesterol  Anemia has resolved no need for IV iron  Follow-up in 6 months     8/18  Clinically appears patient is healing well  Continue PT OT  Home care orders ordered  Blood pressure stable  Continue current medications  Follow-up after blood work       7/8/2023  EKG done shows normal sinus rhythm  Symptoms of kidney stone and diverticulitis has resolved  Refer to colonoscopy  Fatigue could be related to the heart issue  Her ejection fraction was reduced to 45%  Follow-up with the cardiologist  If it still not better will do further work-up  Follow-up in a month    11/14/2023  Scopolamine patch for the seasickness  Tenderness in the left ear and neck is related to TMJ  Advised to follow-up with the dentist  Warm compresses and soft diet  Patient is still taking Valtrex for many years on every day basis  Explained her that she really needs to take as needed now because she has finished suppression therapy  Shingles vaccine prescribe  Blood pressure stable cholesterol is stable  Follow-up blood work in 6 months  Explained that Wegovy is not suitable for her her BMI is not that high and is not going to help her cardiomyopathy

## 2024-07-01 ENCOUNTER — APPOINTMENT (OUTPATIENT)
Dept: PRIMARY CARE | Facility: CLINIC | Age: 64
End: 2024-07-01
Payer: COMMERCIAL

## 2024-07-01 ENCOUNTER — LAB (OUTPATIENT)
Dept: LAB | Facility: LAB | Age: 64
End: 2024-07-01
Payer: COMMERCIAL

## 2024-07-01 VITALS
DIASTOLIC BLOOD PRESSURE: 66 MMHG | SYSTOLIC BLOOD PRESSURE: 128 MMHG | BODY MASS INDEX: 27.14 KG/M2 | WEIGHT: 159 LBS | HEIGHT: 64 IN

## 2024-07-01 DIAGNOSIS — N63.0 BREAST LUMP IN FEMALE: Primary | ICD-10-CM

## 2024-07-01 DIAGNOSIS — N64.52 NIPPLE DISCHARGE: ICD-10-CM

## 2024-07-01 LAB — PROLACTIN SERPL-MCNC: 10.9 UG/L (ref 3–20)

## 2024-07-01 PROCEDURE — 36415 COLL VENOUS BLD VENIPUNCTURE: CPT

## 2024-07-01 PROCEDURE — 99214 OFFICE O/P EST MOD 30 MIN: CPT | Performed by: INTERNAL MEDICINE

## 2024-07-01 PROCEDURE — 4010F ACE/ARB THERAPY RXD/TAKEN: CPT | Performed by: INTERNAL MEDICINE

## 2024-07-01 PROCEDURE — 84146 ASSAY OF PROLACTIN: CPT

## 2024-07-01 PROCEDURE — 3078F DIAST BP <80 MM HG: CPT | Performed by: INTERNAL MEDICINE

## 2024-07-01 PROCEDURE — 3074F SYST BP LT 130 MM HG: CPT | Performed by: INTERNAL MEDICINE

## 2024-07-01 PROCEDURE — 1036F TOBACCO NON-USER: CPT | Performed by: INTERNAL MEDICINE

## 2024-07-01 RX ORDER — LISINOPRIL 2.5 MG/1
2.5 TABLET ORAL DAILY
COMMUNITY
Start: 2024-05-26

## 2024-07-01 RX ORDER — METOPROLOL SUCCINATE 50 MG/1
50 TABLET, EXTENDED RELEASE ORAL DAILY
COMMUNITY
Start: 2024-06-12

## 2024-07-02 ENCOUNTER — HOSPITAL ENCOUNTER (OUTPATIENT)
Dept: RADIOLOGY | Facility: CLINIC | Age: 64
Discharge: HOME | End: 2024-07-02
Payer: COMMERCIAL

## 2024-07-02 DIAGNOSIS — N64.52 NIPPLE DISCHARGE: ICD-10-CM

## 2024-07-02 PROCEDURE — 77065 DX MAMMO INCL CAD UNI: CPT | Mod: LEFT SIDE | Performed by: RADIOLOGY

## 2024-07-02 PROCEDURE — 76642 ULTRASOUND BREAST LIMITED: CPT | Mod: LEFT SIDE | Performed by: RADIOLOGY

## 2024-07-02 PROCEDURE — 77061 BREAST TOMOSYNTHESIS UNI: CPT | Mod: LT

## 2024-07-02 PROCEDURE — 76982 USE 1ST TARGET LESION: CPT | Mod: LT

## 2024-07-02 PROCEDURE — 76642 ULTRASOUND BREAST LIMITED: CPT | Mod: LT

## 2024-07-02 PROCEDURE — 77061 BREAST TOMOSYNTHESIS UNI: CPT | Mod: LEFT SIDE | Performed by: RADIOLOGY

## 2024-07-02 NOTE — PROGRESS NOTES
Subjective   Patient ID: Caroline Robles is a 63 y.o. female who presents for Breast Problem.    HPI   Patient is here with complaints of having discharge from the nipple on the left side for past 1 month color is light yellow to light brown.  Patient has history of lumpectomy on the left side and had radiation  On laying on the left side she noticed pink lump which she does not think is related to the surgery     Past recap   patient is here for follow-up   She had her follow-up with the cardiologist and he started her on half dose of lisinopril 2.5 mg to help with her cardiomyopathy.  She did 2D echo results are pending  She had colonoscopy done which is negative  Her mammogram is normal  She is going on a cruise and worried about seasickness  She wants prescription for shingles vaccine otherwise overall she is feeling   Patient is complaining of pain in the left side of the neck and ear for the past few days today it feels little better sharp pain  She is also asking for Wegovy as it is approved for the cardiac indication and with her cardiomyopathy if she will be a candidate because she would like to lose some weight    Past recap    patient is here for hospital follow-up  She was admitted for obstructive uropathy acute diverticulitis and also went into atrial flutter.  Patient has history of SVT in the past had 2 ablations but this time she was in atrial flutter.  Started on high dose of beta-blocker and anticoagulants  Patient did make follow-up with her [facility] cardiologist  Her symptoms of acute diverticulitis have resolved  She is not having any pain from the kidney stone which is expected to pass on its own   Patient is still feeling wiped out her legs feel very weak shaky she gets out of breath    patient is here with complaints of having head congestion sinus congestion cough postnasal drainage  3 COVID test negative  Patient is treated for cardiomyopathy and SVT     Patient is here for hospital  follow-up  In June she fell and broke her right patella  Wound needed debridement she also had left ulna and radius fracture  Fall happened in her driveway where her shoe got caught in uneven surface  She is able to walk now weightbearing  She is using immobilizer but no more using the walker  She had her third cast change next she is working with OT for shower with assistance     Patient is here for follow-up  He was in the hospital again for SVT  She has a follow-up coming up with her cardiologist to discuss more options because she had a failed ablation  She is doing blood work before her appointment  She needs refills on Valtrex she has history of genital herpes and she has been on maintenance therapy for a long time and she is not able to the medication  Her son got diagnosed with Covid but he is not seen her for 2 weeks  She is not doing any more iron infusions  She is off her breast cancer medication because she was having terrible hot flashes  She is also taken off the digoxin by the cardiologist  Also complaining of burning sensation on the back of the right leg  She does sit a lot at work        Social history non-smoker nondrinker     past hx  Patient here for follow-up on blood work continues to have bilateral foot pain and knee pain  Her morning stiffness foot and ankle pain started after she got iron transfusion fibrillation some month ago  She's also feeling numbness and tingling in both the legs  She finished antibiotic still feels some fullness in the neck throat is feeling much better sinuses feeling better     Patient has a cardiac ablation done but it failed  She had iron transfusion  She was started on digoxin but then patient developed swelling and pain in the right knee and the ankle so she quit taking digoxin and taking Motrin but seems to be helping a little  She is here to get her right knee checked     Patient here for follow-up on CAT scan of the chest   She continues to have episodes of  "cough  Sometimes she gets quite nauseous that she throws up  On February 21 she is scheduled for cardiac ablation  Cardiologist does not think that shortness of breath is related to heart     PMH   Patient has history of breast cancer  Patient lost her  has moved from the [place] to [place]   Weeks ago patient was [place] for SVT  History of breast cancer status post left lumpectomy  She is under care of electrophysiologist cardiologist for arrhythmia   Review of Systems    Objective   /66   Ht 1.625 m (5' 3.98\")   Wt 72.1 kg (159 lb)   BMI 27.31 kg/m²     Physical Exam  Vitals reviewed.   Constitutional:       Appearance: Normal appearance.   HENT:      Head: Normocephalic and atraumatic.      Right Ear: Tympanic membrane, ear canal and external ear normal.      Left Ear: Tympanic membrane, ear canal and external ear normal.      Nose: Nose normal.      Mouth/Throat:      Pharynx: Oropharynx is clear.   Eyes:      Extraocular Movements: Extraocular movements intact.      Conjunctiva/sclera: Conjunctivae normal.      Pupils: Pupils are equal, round, and reactive to light.   Cardiovascular:      Rate and Rhythm: Normal rate and regular rhythm.      Pulses: Normal pulses.      Heart sounds: Normal heart sounds.   Pulmonary:      Effort: Pulmonary effort is normal.      Breath sounds: Normal breath sounds.   Abdominal:      General: Abdomen is flat. Bowel sounds are normal.      Palpations: Abdomen is soft.   Musculoskeletal:      Cervical back: Normal range of motion and neck supple.   Skin:     General: Skin is warm and dry.   Neurological:      General: No focal deficit present.      Mental Status: She is alert and oriented to person, place, and time.   Psychiatric:         Mood and Affect: Mood normal.         Assessment/Plan   Problem List Items Addressed This Visit    None  Visit Diagnoses       Breast lump in female    -  Primary    Nipple discharge        Relevant Orders    BI US breast " complete left    BI mammo bilateral diagnostic    Prolactin level (Completed)          past recap  I'm wondering if patient had side effects to the IV iron treatment or possible viral symptoms related to her sickness or the anesthesia she was through for her heart procedure  She is definitely looking better feeling better her blood work has improved  Will repeat her CBC CMP iron studies before giving her next iron infusion  Will also check cholesterol in 3 months  Will repeat KILO titers  Continue with exercise program     11/12  Burning pain on the right leg most likely from neuropathy  Advised patient to move around more and see if the pain gets better  Valtrex prescription given patient does not want to go off the medication  Screening colonoscopy she is due for ordered  Bone density ordered  Her heart rate is fine today  We will order blood work for cholesterol  Anemia has resolved no need for IV iron  Follow-up in 6 months     8/18  Clinically appears patient is healing well  Continue PT OT  Home care orders ordered  Blood pressure stable  Continue current medications  Follow-up after blood work       7/8/2023  EKG done shows normal sinus rhythm  Symptoms of kidney stone and diverticulitis has resolved  Refer to colonoscopy  Fatigue could be related to the heart issue  Her ejection fraction was reduced to 45%  Follow-up with the cardiologist  If it still not better will do further work-up  Follow-up in a month    11/14/2023  Scopolamine patch for the seasickness  Tenderness in the left ear and neck is related to TMJ  Advised to follow-up with the dentist  Warm compresses and soft diet  Patient is still taking Valtrex for many years on every day basis  Explained her that she really needs to take as needed now because she has finished suppression therapy  Shingles vaccine prescribe  Blood pressure stable cholesterol is stable  Follow-up blood work in 6 months  Explained that Wegovy is not suitable for her her BMI  is not that high and is not going to help her cardiomyopathy    7/1/2024  On examination patient does have a lump and pressing the lump she has more nipple discharge  Will get ultrasound left breast  Diagnostic mammogram  Prolactin levels to check  Follow-up after the test

## 2024-07-08 ENCOUNTER — OFFICE VISIT (OUTPATIENT)
Dept: SURGICAL ONCOLOGY | Facility: CLINIC | Age: 64
End: 2024-07-08
Payer: COMMERCIAL

## 2024-07-08 VITALS
OXYGEN SATURATION: 98 % | WEIGHT: 158.51 LBS | BODY MASS INDEX: 27.23 KG/M2 | SYSTOLIC BLOOD PRESSURE: 119 MMHG | TEMPERATURE: 97.5 F | DIASTOLIC BLOOD PRESSURE: 72 MMHG | HEART RATE: 62 BPM | RESPIRATION RATE: 18 BRPM

## 2024-07-08 DIAGNOSIS — N64.52 NIPPLE DISCHARGE: Primary | ICD-10-CM

## 2024-07-08 PROCEDURE — 99213 OFFICE O/P EST LOW 20 MIN: CPT | Performed by: NURSE PRACTITIONER

## 2024-07-08 PROCEDURE — 3074F SYST BP LT 130 MM HG: CPT | Performed by: NURSE PRACTITIONER

## 2024-07-08 PROCEDURE — 1036F TOBACCO NON-USER: CPT | Performed by: NURSE PRACTITIONER

## 2024-07-08 PROCEDURE — 3078F DIAST BP <80 MM HG: CPT | Performed by: NURSE PRACTITIONER

## 2024-07-08 PROCEDURE — 4010F ACE/ARB THERAPY RXD/TAKEN: CPT | Performed by: NURSE PRACTITIONER

## 2024-07-08 ASSESSMENT — ENCOUNTER SYMPTOMS
OCCASIONAL FEELINGS OF UNSTEADINESS: 0
LOSS OF SENSATION IN FEET: 0
DEPRESSION: 0

## 2024-07-08 ASSESSMENT — PAIN SCALES - GENERAL: PAINLEVEL: 0-NO PAIN

## 2024-07-08 NOTE — PROGRESS NOTES
Caroline Robles female   1960 63 y.o.   [id number]      Chief Complaint  Left nipple discharge    History Of Present Illness  Caroline Robles is a 63 y.o.  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 17. 2017 Dr Ramírez performed left lumpectomy, negative margins, 1mm to inferior margin. She complete radiation therapy. She was on Anastrozole from 10/2017- 10/2021(4 years), stopping early due to vasomotor hot flashes.   Stage 0 pTis    She presents today for left nipple discharge. She first noticed the left nipple discharge about one month ago. She states she noticed it on her shirt in the morning after sleeping on her left side. She states it is clear to yellow in color.      BREAST IMAGIN2024 Left diagnostic mammogram and ultrasound, indicates BI-RADS Category 2. 2023 Bilateral screening mammogram, indicates BI-RADS Category 2.      REPRODUCTIVE HISTORY: menarche age 15, , first birth age 28, menopause age 45, heterogeneously dense tissue     FAMILY CANCER HISTORY:  Sister: Pancreatic cancer    Surgical History  She has a past surgical history that includes Other surgical history (2019);  section, classic (2017); Tonsillectomy (2017); Other surgical history (2017); BI mammo guided breast left localization (Left, 2017); and Breast lumpectomy (Left, ).     Social History  She reports that she has never smoked. She has never used smokeless tobacco. She reports that she does not drink alcohol and does not use drugs.    Family History  Family History   Problem Relation Name Age of Onset    Other (Cardiac disorder) Mother      Lung cancer Father      Alcohol abuse Other Family history     Cancer Other Family history     Pancreatic cancer Sibling          Allergies  Patient has no known allergies.    Medications  Current Outpatient Medications   Medication  Instructions    cyanocobalamin (VITAMIN B-12) 1,000 mcg, oral, Daily    Eliquis 5 mg, oral, 2 times daily    esomeprazole (NEXIUM) 20 mg, oral, Daily before breakfast    lisinopril 2.5 mg, oral, Daily    metoprolol succinate XL (TOPROL-XL) 50 mg, oral, Daily    metoprolol tartrate (LOPRESSOR) 50 mg, oral, 2 times daily    PARoxetine (PAXIL) 20 mg, oral, Every morning    valACYclovir (VALTREX) 500 mg, oral, Daily         REVIEW OF SYSTEMS    Constitutional:  Negative for appetite change, fatigue, fever and unexpected weight change.   HENT:  Negative for ear pain, hearing loss, nosebleeds, sore throat and trouble swallowing.    Eyes:  Negative for discharge, itching and visual disturbance.   Respiratory:  Negative for cough, chest tightness and shortness of breath.    Cardiovascular:  Negative for chest pain, palpitations and leg swelling.   Breast: as indicated in HPI  Gastrointestinal:  Negative for abdominal pain, constipation, diarrhea and nausea.   Endocrine: Negative for cold intolerance and heat intolerance.   Genitourinary:  Negative for dysuria, frequency, hematuria, pelvic pain and vaginal bleeding.   Musculoskeletal:  Negative for arthralgias, back pain, gait problem, joint swelling and myalgias.   Skin:  Negative for color change and rash.   Allergic/Immunologic: Negative for environmental allergies and food allergies.   Neurological:  Negative for dizziness, tremors, speech difficulty, weakness, numbness and headaches.   Hematological:  Does not bruise/bleed easily.   Psychiatric/Behavioral:  Negative for agitation, dysphoric mood and sleep disturbance. The patient is not nervous/anxious.         Past Medical History  She has a past medical history of Breast neoplasm, Tis (DCIS), left (07/01/2017), Disorder of bone, unspecified (04/20/2018), Disorder of bone, unspecified (08/22/2017), History of uterine scar from previous surgery, Other conditions influencing health status, Personal history of other  diseases of the circulatory system (10/28/2014), Personal history of other diseases of the digestive system (10/28/2014), Personal history of other diseases of the respiratory system (05/18/2017), and Personal history of other malignant neoplasm of skin.     Physical Exam  Patient is alert and oriented x3 and in a relaxed and appropriate mood. Her gait is steady and hand grasps are equal. Sclera is clear. The breasts are nearly symmetrical. The tissue is soft without palpable abnormalities, discrete nodules or masses. The left breast has a well-healed partial mastectomy incision. There is a palpable 2 x 2 cm seroma at the 8:00 subareolar position. There is spontaneous yellow nipple discharge elicited upon palpation of left breast. The skin and right nipple appear normal. There is no cervical, supraclavicular or axillary lymphadenopathy. Heart rate and rhythm normal, S1 and S2 appreciated. The lungs are clear to auscultation bilaterally. Abdomen is soft and non-tender.       Physical Exam  Chest:              Last Recorded Vitals  Vitals:    07/08/24 1426   BP: 119/72   Pulse: 62   Resp: 18   Temp: 36.4 °C (97.5 °F)   SpO2: 98%       Relevant Results   Time was spent discussing digital images of the radiology testing with the patient. I explained the results in depth, along with suggested explanation for follow up recommendations based on the testing results. BI-RADS Category 2    Imaging    Narrative & Impression   Interpreted By:  Skye Moore,   STUDY:  BI MAMMO LEFT DIAGNOSTIC TOMOSYNTHESIS; BI US BREAST LIMITED LEFT;  7/2/2024 11:36 am; 7/2/2024 12:00 pm      ACCESSION NUMBER(S):  [id number]; [id number]      ORDERING CLINICIAN:  RADHA THIBODEAUX      INDICATION:  History of a left lumpectomy with radiation therapy in 2017. Left  nipple discharge which is non spontaneous for 1 month. Patient  describes the discharge is clear to yellow in color.      COMPARISON:  11/13/2023, 11/08/2022, 10/05/2021, 09/24/2019       FINDINGS:  MAMMOGRAPHY: 2D and tomosynthesis images were reviewed at 1 mm slice  thickness.      Density:  The breast tissue is heterogeneously dense, which may  obscure small masses.      In the medial inferior left breast there are surgical clips and a  seroma at the lumpectomy site. The lumpectomy site is stable. The  parenchymal pattern is stable. No suspicious masses or calcifications  are identified.      ULTRASOUND: Targeted ultrasound was performed of the subareolar  location. There are no suspicious masses or suspicious areas of  acoustic shadowing. At the subareolar 7 o'clock position there is a  seroma with mobile debris. The seroma measures 2.1 x 1.7 x 1.7 cm.      IMPRESSION:  No mammographic or targeted sonographic evidence of malignancy. No  reason found to explain the patient's left nipple discharge.  Recommendation is for clinical follow-up. This was discussed with the  patient at the time of her visit with Dr. Moore. The patient was  given Anastasia Rojas's card. The patient already had an appointment  with Anastasia Rojas in November and will schedule an appointment  sooner.      BI-RADS CATEGORY:  BI-RADS Category:  2 Benign.  Recommendation:  Clinical Follow-up and Continued Annual Screening.  Recommended Date:  Immediate.  Laterality:  Left.         Assessment/Plan   Normal clinical exam and imaging, history of left DCIS, left nipple discharge, no family history of breast cancer, heterogeneously dense tissue    Plan: Fast breast MRI for further evaluation of left nipple discharge.     Patient Discussion/Summary  Your clinical examination and imaging are normal. Please schedule a Fast breast MRI for further evaluation of your left nipple discharge. I will call you with the results.     You can see your health information, review clinical summaries from office visits & test results online when you follow your health with MY  Chart, a personal health record. To sign up go to  [email or web address]. If you need assistance with signing up or trouble getting into your account call [employer] Patient Line 24/7 at [phone].    My office phone number is [phone] if you need to get in touch with me or have additional questions or concerns. Thank you for choosing [facility] and trusting me as your healthcare provider. I look forward to seeing you again at your next office visit. I am honored to be a provider on your health care team and I remain dedicated to helping you achieve your health goals.      Esthela Ponce, APRN-CNP

## 2024-07-08 NOTE — PATIENT INSTRUCTIONS
Your clinical examination and imaging are normal. Please schedule a Fast breast MRI for further evaluation of your left nipple discharge. I will call you with the results.     You can see your health information, review clinical summaries from office visits & test results online when you follow your health with MY  Chart, a personal health record. To sign up go to [email or web address]. If you need assistance with signing up or trouble getting into your account call [employer] Patient Line 24/7 at [phone].    My office phone number is [phone] if you need to get in touch with me or have additional questions or concerns. Thank you for choosing [facility] and trusting me as your healthcare provider. I look forward to seeing you again at your next office visit. I am honored to be a provider on your health care team and I remain dedicated to helping you achieve your health goals.

## 2024-07-16 ENCOUNTER — HOSPITAL ENCOUNTER (OUTPATIENT)
Dept: RADIOLOGY | Facility: HOSPITAL | Age: 64
Discharge: HOME | End: 2024-07-16
Payer: COMMERCIAL

## 2024-07-16 DIAGNOSIS — N64.52 NIPPLE DISCHARGE: ICD-10-CM

## 2024-07-16 PROCEDURE — 6100000003 BI MR BREAST BILATERAL WITH CONTRAST FAST SCREENING SELF PAY

## 2024-07-16 PROCEDURE — 2550000001 HC RX 255 CONTRASTS: Performed by: NURSE PRACTITIONER

## 2024-07-16 PROCEDURE — A9575 INJ GADOTERATE MEGLUMI 0.1ML: HCPCS | Performed by: NURSE PRACTITIONER

## 2024-07-16 RX ORDER — GADOTERATE MEGLUMINE 376.9 MG/ML
14 INJECTION INTRAVENOUS
Status: COMPLETED | OUTPATIENT
Start: 2024-07-16 | End: 2024-07-16

## 2024-09-20 ENCOUNTER — LAB (OUTPATIENT)
Dept: LAB | Facility: LAB | Age: 64
End: 2024-09-20
Payer: COMMERCIAL

## 2024-09-20 DIAGNOSIS — I47.10 SUPRAVENTRICULAR TACHYCARDIA (CMS-HCC): ICD-10-CM

## 2024-09-20 DIAGNOSIS — R55 SYNCOPE AND COLLAPSE: ICD-10-CM

## 2024-09-20 DIAGNOSIS — I42.0 DILATED CARDIOMYOPATHY (MULTI): Primary | ICD-10-CM

## 2024-09-20 LAB
ALBUMIN SERPL BCP-MCNC: 4.4 G/DL (ref 3.4–5)
ALP SERPL-CCNC: 78 U/L (ref 33–136)
ALT SERPL W P-5'-P-CCNC: 18 U/L (ref 7–45)
ANION GAP SERPL CALC-SCNC: 15 MMOL/L (ref 10–20)
AST SERPL W P-5'-P-CCNC: 22 U/L (ref 9–39)
BASOPHILS # BLD AUTO: 0.05 X10*3/UL (ref 0–0.1)
BASOPHILS NFR BLD AUTO: 1 %
BILIRUB SERPL-MCNC: 0.6 MG/DL (ref 0–1.2)
BUN SERPL-MCNC: 14 MG/DL (ref 6–23)
CALCIUM SERPL-MCNC: 9.4 MG/DL (ref 8.6–10.6)
CHLORIDE SERPL-SCNC: 105 MMOL/L (ref 98–107)
CHOLEST SERPL-MCNC: 261 MG/DL (ref 0–199)
CHOLESTEROL/HDL RATIO: 5.3
CO2 SERPL-SCNC: 25 MMOL/L (ref 21–32)
CREAT SERPL-MCNC: 0.81 MG/DL (ref 0.5–1.05)
EGFRCR SERPLBLD CKD-EPI 2021: 81 ML/MIN/1.73M*2
EOSINOPHIL # BLD AUTO: 0.09 X10*3/UL (ref 0–0.7)
EOSINOPHIL NFR BLD AUTO: 1.8 %
ERYTHROCYTE [DISTWIDTH] IN BLOOD BY AUTOMATED COUNT: 13.4 % (ref 11.5–14.5)
GLUCOSE SERPL-MCNC: 94 MG/DL (ref 74–99)
HCT VFR BLD AUTO: 40.8 % (ref 36–46)
HDLC SERPL-MCNC: 49.2 MG/DL
HGB BLD-MCNC: 13.5 G/DL (ref 12–16)
IMM GRANULOCYTES # BLD AUTO: 0.01 X10*3/UL (ref 0–0.7)
IMM GRANULOCYTES NFR BLD AUTO: 0.2 % (ref 0–0.9)
LDLC SERPL CALC-MCNC: 169 MG/DL
LYMPHOCYTES # BLD AUTO: 1.67 X10*3/UL (ref 1.2–4.8)
LYMPHOCYTES NFR BLD AUTO: 33.5 %
MCH RBC QN AUTO: 31 PG (ref 26–34)
MCHC RBC AUTO-ENTMCNC: 33.1 G/DL (ref 32–36)
MCV RBC AUTO: 94 FL (ref 80–100)
MONOCYTES # BLD AUTO: 0.47 X10*3/UL (ref 0.1–1)
MONOCYTES NFR BLD AUTO: 9.4 %
NEUTROPHILS # BLD AUTO: 2.69 X10*3/UL (ref 1.2–7.7)
NEUTROPHILS NFR BLD AUTO: 54.1 %
NON HDL CHOLESTEROL: 212 MG/DL (ref 0–149)
NRBC BLD-RTO: 0 /100 WBCS (ref 0–0)
PLATELET # BLD AUTO: 290 X10*3/UL (ref 150–450)
POTASSIUM SERPL-SCNC: 4.6 MMOL/L (ref 3.5–5.3)
PROT SERPL-MCNC: 6.9 G/DL (ref 6.4–8.2)
RBC # BLD AUTO: 4.36 X10*6/UL (ref 4–5.2)
SODIUM SERPL-SCNC: 140 MMOL/L (ref 136–145)
TRIGL SERPL-MCNC: 213 MG/DL (ref 0–149)
VLDL: 43 MG/DL (ref 0–40)
WBC # BLD AUTO: 5 X10*3/UL (ref 4.4–11.3)

## 2024-09-20 PROCEDURE — 80053 COMPREHEN METABOLIC PANEL: CPT

## 2024-09-20 PROCEDURE — 85025 COMPLETE CBC W/AUTO DIFF WBC: CPT

## 2024-09-20 PROCEDURE — 80061 LIPID PANEL: CPT

## 2024-09-21 ENCOUNTER — APPOINTMENT (OUTPATIENT)
Dept: SURGICAL ONCOLOGY | Facility: CLINIC | Age: 64
End: 2024-09-21
Payer: COMMERCIAL

## 2024-09-26 ENCOUNTER — OFFICE VISIT (OUTPATIENT)
Dept: CARDIOLOGY | Facility: HOSPITAL | Age: 64
End: 2024-09-26
Payer: COMMERCIAL

## 2024-09-26 VITALS
WEIGHT: 161 LBS | HEART RATE: 89 BPM | HEIGHT: 64 IN | SYSTOLIC BLOOD PRESSURE: 111 MMHG | OXYGEN SATURATION: 95 % | DIASTOLIC BLOOD PRESSURE: 71 MMHG | BODY MASS INDEX: 27.49 KG/M2

## 2024-09-26 DIAGNOSIS — I48.92 ATRIAL FLUTTER, UNSPECIFIED TYPE (MULTI): ICD-10-CM

## 2024-09-26 DIAGNOSIS — I50.20 ACC/AHA STAGE C SYSTOLIC HEART FAILURE: Primary | ICD-10-CM

## 2024-09-26 PROCEDURE — 3050F LDL-C >= 130 MG/DL: CPT | Performed by: INTERNAL MEDICINE

## 2024-09-26 PROCEDURE — 3074F SYST BP LT 130 MM HG: CPT | Performed by: INTERNAL MEDICINE

## 2024-09-26 PROCEDURE — 3078F DIAST BP <80 MM HG: CPT | Performed by: INTERNAL MEDICINE

## 2024-09-26 PROCEDURE — 99214 OFFICE O/P EST MOD 30 MIN: CPT | Performed by: INTERNAL MEDICINE

## 2024-09-26 PROCEDURE — 93005 ELECTROCARDIOGRAM TRACING: CPT | Performed by: INTERNAL MEDICINE

## 2024-09-26 PROCEDURE — 99204 OFFICE O/P NEW MOD 45 MIN: CPT | Performed by: INTERNAL MEDICINE

## 2024-09-26 PROCEDURE — 3008F BODY MASS INDEX DOCD: CPT | Performed by: INTERNAL MEDICINE

## 2024-09-26 PROCEDURE — 1036F TOBACCO NON-USER: CPT | Performed by: INTERNAL MEDICINE

## 2024-09-26 RX ORDER — SACUBITRIL AND VALSARTAN 24; 26 MG/1; MG/1
1 TABLET, FILM COATED ORAL 2 TIMES DAILY
COMMUNITY

## 2024-09-26 RX ORDER — ROSUVASTATIN CALCIUM 5 MG/1
5 TABLET, COATED ORAL DAILY
COMMUNITY

## 2024-09-26 NOTE — PATIENT INSTRUCTIONS
It was a pleasure seeing you today. Please contact myself or my team with any questions.     To reach Dr. Snider' office please call [phone] (Susan).   Fax: [phone]   To schedule an appointment call [phone]     If you have any questions or need cardiac medication refills, please call the Heart Failure office at [phone], option 6. You may also contact the  Heart Failure Nursing team via email at [email or web address] (Please include your name and date of birth).        1) Continue your current medications; with entresto to start Saturday  2) Monitor your blood pressure and heart rate and call/email/Epic chart in 1-2 weeks  3) Labs in 1-2 weeks (RFP/BNP/TSH with reflex)  4) Please contact us with the results of the heart catheterization  5) Follow up in 3 months at /[facility]

## 2024-09-26 NOTE — PROGRESS NOTES
[facility] Advanced [facility] Failure [facility]  Primary Care Physician: Juli Teixeira MD  Referring Provider/Cardiologist: Bill ([facility])    Date of Visit: 2024  3:20 PM EDT  Location of visit: [facility]     HPI:   Ms. Robles is a 63F with a PMHx sig for AVNRT s/p RFA (), SSS, h/o PVCs, stage C systolic HF/HFrEF currently without an ICD, aflutter on DOAC therapy, dyslipidemia, and GERD who was referred to the Advanced Heart Failure clinic for consultation.     She was recently seen by Dr. Khan and underwent an evaluation which has noted progressive LV dysfunction (most current EF 35%). Of note, she has a h/o HFrEF and had improved LVEF. A recent holter monitor performed failed to demonstrate and sustained arrhythmias. She reports exertional chest/abdominal pressure with dyspnea/diaphoresis and has been scheduled for a cardiac catheterization at [facility] next week. She currently denies palpitations or LE edema.     Hospitalizations: Patient denies  Medication adherence: Patient states yes       PMHx:  AVNRT s/p RFA (), SSS, h/o PVCs, stage C systolic HF/HFrEF currently without an ICD, aflutter on DOAC therapy, dyslipidemia, GERD     SocHx:  Lives alone in [place]  Denies etoh/tobacco/illicits  Office job in accounting    FamHx:  Mother with AF  Brothers x2  of cardiac issues         Current Outpatient Medications   Medication Sig Dispense Refill    cyanocobalamin (Vitamin B-12) 1,000 mcg tablet Take 1 tablet (1,000 mcg) by mouth once daily.      Eliquis 5 mg tablet Take 1 tablet (5 mg) by mouth 2 times a day.      esomeprazole (NexIUM) 20 mg DR capsule Take 1 capsule (20 mg) by mouth once daily in the morning. Take before meals.      metoprolol succinate XL (Toprol-XL) 50 mg 24 hr tablet Take 1 tablet (50 mg) by mouth once daily.      PARoxetine (Paxil) 20 mg tablet Take 1 tablet (20 mg) by mouth once daily in the morning.      rosuvastatin (Crestor) 5 mg tablet Take 1  "tablet (5 mg) by mouth once daily.      sacubitriL-valsartan (Entresto) 24-26 mg tablet Take 1 tablet by mouth 2 times a day.      valACYclovir (Valtrex) 500 mg tablet Take 1 tablet (500 mg) by mouth once daily. 90 tablet 3    lisinopril 2.5 mg tablet Take 1 tablet (2.5 mg) by mouth once daily.      metoprolol tartrate (Lopressor) 50 mg tablet Take 1 tablet by mouth 2 times a day.       No current facility-administered medications for this visit.       No Known Allergies      Visit Vitals  /71 (BP Location: Left arm, Patient Position: Sitting)   Pulse 89   Ht 1.613 m (5' 3.5\")   Wt 73 kg (161 lb)   SpO2 95%   BMI 28.07 kg/m²   OB Status Postmenopausal   Smoking Status Never   BSA 1.81 m²        Physical Exam:  On exam Ms. Robles appears her stated age, is alert and oriented x3, and in no acute distress. Her sclera are anicteric and her oropharynx has moist mucous membranes. Her neck is supple and without thyromegaly. The JVP is ~6 cm of water above the right atrium. Her cardiac exam has regular rhythm, normal S1, S2. No S3/4. There are no murmurs. Her lungs are clear to auscultation bilaterally and there is no dullness to percussion. Her abdomen is soft, nontender with normoactive bowel sounds. There is no HJR. The extremities are warm and without edema. The skin is dry. There is no rash present. The distal pulses are 2+ in all four extremities. Her mood and affect are appropriate for todays encounter.         Cardiac Labs/Diagnostics:    Lab Results   Component Value Date    CREATININE 0.81 09/20/2024    BUN 14 09/20/2024     09/20/2024    K 4.6 09/20/2024     09/20/2024    CO2 25 09/20/2024        Recent Labs     09/20/24  1326 06/29/23  0540 11/12/20  1000 10/26/18  0942 04/20/18  1043   CHOL 261* 163 238* 225* 275*   LDLF  --   --  157* 138* 170*   LDLCALC 169* 94  --   --   --    HDL 49.2 41* 52.7 52.1 47.7   TRIG 213* 140 142 174* 285*       ECG (9/26/24):  Sinus rhythm (HR 82), " iRBBB    24hr holter (9/10/24):   9% PVCs and PACs, no sustained arrhythmias    Echo (9/9/24):  LVEF 30-35% (LVIDD 5.4 cm)  Normal RV size/function  Mild AI  Mild to mod MR  Mild TR      Impression/Plan:  Ms. Robles is a 63F with a PMHx sig for AVNRT s/p RFA (2021), SSS, h/o PVCs, stage C systolic HF/HFrEF currently without an ICD, aflutter on DOAC therapy, dyslipidemia, and GERD who was referred to the Advanced Heart Failure clinic for consultation. At the current time she has functional class II symptoms and appears euvolemic on exam.     1) Stage C chronic systolic HF/HFrEF with moderate to severe LV dysfunction (LVEF 30-35%; 9/2024) currently without an ICD  Referred for evaluation of her cardiomyopathy. H/o HFrEF which improved after her ablation, now with recurrence. Recent monitor without significant sustained arrhythmias. She is scheduled for a cardiac catheterization next week for evaluation. Will focus on optimizing GDMT for HFrEF and await the results of her cardiac catheterization. She is scheduled to start ARNi this weekend to allow ACEi wash out.   -c/w metoprolol succinate 50 mg daily  -start entresto 24/26 mg bid  -monitor vitals and report in 1-2 weeks  -labs in 7-10 days (RFP/BNP/TSH with reflex)  -based on the above, will uptitrate GDMT as tolerated  -asked her to call and update us with the results of the cardiac catheterization  -once on OMT, will consider cMRI to evaluate further given her fluctuating LVEF      F/U: 3 months at /Murtaza Khan,  Thank you for referring Ms. Robles to the  Advanced Heart Failure Clinic. Please let me know if you have any questions.     ____________________________________________________________  Dominic Snider DO  Section of Advanced Heart Failure and Cardiac Transplantation  Division of Cardiovascular Medicine  [facility]  [facility]

## 2024-09-27 LAB
ATRIAL RATE: 82 BPM
P AXIS: 35 DEGREES
P OFFSET: 180 MS
P ONSET: 128 MS
PR INTERVAL: 200 MS
Q ONSET: 228 MS
QRS COUNT: 14 BEATS
QRS DURATION: 96 MS
QT INTERVAL: 372 MS
QTC CALCULATION(BAZETT): 434 MS
QTC FREDERICIA: 412 MS
R AXIS: 105 DEGREES
T AXIS: 14 DEGREES
T OFFSET: 414 MS
VENTRICULAR RATE: 82 BPM

## 2024-11-14 ENCOUNTER — HOSPITAL ENCOUNTER (OUTPATIENT)
Dept: RADIOLOGY | Facility: CLINIC | Age: 64
Discharge: HOME | End: 2024-11-14
Payer: COMMERCIAL

## 2024-11-14 VITALS — BODY MASS INDEX: 27.48 KG/M2 | HEIGHT: 64 IN | WEIGHT: 160.94 LBS

## 2024-11-14 DIAGNOSIS — Z00.00 HEALTHCARE MAINTENANCE: ICD-10-CM

## 2024-11-14 PROCEDURE — 77067 SCR MAMMO BI INCL CAD: CPT

## 2024-11-16 ENCOUNTER — APPOINTMENT (OUTPATIENT)
Dept: CARDIOLOGY | Facility: HOSPITAL | Age: 64
End: 2024-11-16
Payer: COMMERCIAL

## 2024-11-16 ENCOUNTER — HOSPITAL ENCOUNTER (OUTPATIENT)
Facility: HOSPITAL | Age: 64
Setting detail: OBSERVATION
End: 2024-11-16
Attending: EMERGENCY MEDICINE | Admitting: INTERNAL MEDICINE
Payer: COMMERCIAL

## 2024-11-16 ENCOUNTER — APPOINTMENT (OUTPATIENT)
Dept: RADIOLOGY | Facility: HOSPITAL | Age: 64
End: 2024-11-16
Payer: COMMERCIAL

## 2024-11-16 DIAGNOSIS — S62.662A CLOSED NONDISPLACED FRACTURE OF DISTAL PHALANX OF RIGHT MIDDLE FINGER, INITIAL ENCOUNTER: ICD-10-CM

## 2024-11-16 DIAGNOSIS — G45.9 TIA (TRANSIENT ISCHEMIC ATTACK): Primary | ICD-10-CM

## 2024-11-16 LAB
ALBUMIN SERPL BCP-MCNC: 3.7 G/DL (ref 3.4–5)
ALP SERPL-CCNC: 56 U/L (ref 33–136)
ALT SERPL W P-5'-P-CCNC: 14 U/L (ref 7–45)
ANION GAP SERPL CALCULATED.3IONS-SCNC: 10 MMOL/L (ref 10–20)
APTT PPP: 25.7 SECONDS (ref 22–32.5)
AST SERPL W P-5'-P-CCNC: 19 U/L (ref 9–39)
BASOPHILS # BLD AUTO: 0.06 X10*3/UL (ref 0–0.1)
BASOPHILS NFR BLD AUTO: 1 %
BILIRUB SERPL-MCNC: 0.5 MG/DL (ref 0–1.2)
BUN SERPL-MCNC: 23 MG/DL (ref 6–23)
CALCIUM SERPL-MCNC: 8.7 MG/DL (ref 8.6–10.3)
CARDIAC TROPONIN I PNL SERPL HS: <3 NG/L (ref 0–13)
CHLORIDE SERPL-SCNC: 107 MMOL/L (ref 98–107)
CO2 SERPL-SCNC: 22 MMOL/L (ref 21–32)
CREAT SERPL-MCNC: 0.86 MG/DL (ref 0.5–1.05)
EGFRCR SERPLBLD CKD-EPI 2021: 75 ML/MIN/1.73M*2
EOSINOPHIL # BLD AUTO: 0.07 X10*3/UL (ref 0–0.7)
EOSINOPHIL NFR BLD AUTO: 1.1 %
ERYTHROCYTE [DISTWIDTH] IN BLOOD BY AUTOMATED COUNT: 13.3 % (ref 11.5–14.5)
GLUCOSE SERPL-MCNC: 121 MG/DL (ref 74–99)
HCT VFR BLD AUTO: 36.9 % (ref 36–46)
HGB BLD-MCNC: 12.6 G/DL (ref 12–16)
IMM GRANULOCYTES # BLD AUTO: 0.02 X10*3/UL (ref 0–0.7)
IMM GRANULOCYTES NFR BLD AUTO: 0.3 % (ref 0–0.9)
INR PPP: 1.1 (ref 0.9–1.2)
LYMPHOCYTES # BLD AUTO: 1.25 X10*3/UL (ref 1.2–4.8)
LYMPHOCYTES NFR BLD AUTO: 20 %
MCH RBC QN AUTO: 32.1 PG (ref 26–34)
MCHC RBC AUTO-ENTMCNC: 34.1 G/DL (ref 32–36)
MCV RBC AUTO: 94 FL (ref 80–100)
MONOCYTES # BLD AUTO: 0.37 X10*3/UL (ref 0.1–1)
MONOCYTES NFR BLD AUTO: 5.9 %
NEUTROPHILS # BLD AUTO: 4.48 X10*3/UL (ref 1.2–7.7)
NEUTROPHILS NFR BLD AUTO: 71.7 %
NRBC BLD-RTO: 0 /100 WBCS (ref 0–0)
PLATELET # BLD AUTO: 243 X10*3/UL (ref 150–450)
POTASSIUM SERPL-SCNC: 3.4 MMOL/L (ref 3.5–5.3)
PROT SERPL-MCNC: 6.2 G/DL (ref 6.4–8.2)
PROTHROMBIN TIME: 11.1 SECONDS (ref 9.3–12.7)
RBC # BLD AUTO: 3.93 X10*6/UL (ref 4–5.2)
SODIUM SERPL-SCNC: 136 MMOL/L (ref 136–145)
WBC # BLD AUTO: 6.3 X10*3/UL (ref 4.4–11.3)

## 2024-11-16 PROCEDURE — 36415 COLL VENOUS BLD VENIPUNCTURE: CPT

## 2024-11-16 PROCEDURE — 70450 CT HEAD/BRAIN W/O DYE: CPT

## 2024-11-16 PROCEDURE — 99285 EMERGENCY DEPT VISIT HI MDM: CPT | Mod: 25

## 2024-11-16 PROCEDURE — G0378 HOSPITAL OBSERVATION PER HR: HCPCS

## 2024-11-16 PROCEDURE — 2500000004 HC RX 250 GENERAL PHARMACY W/ HCPCS (ALT 636 FOR OP/ED)

## 2024-11-16 PROCEDURE — 70496 CT ANGIOGRAPHY HEAD: CPT | Performed by: RADIOLOGY

## 2024-11-16 PROCEDURE — 85025 COMPLETE CBC W/AUTO DIFF WBC: CPT

## 2024-11-16 PROCEDURE — 73130 X-RAY EXAM OF HAND: CPT | Mod: RIGHT SIDE | Performed by: RADIOLOGY

## 2024-11-16 PROCEDURE — 2500000002 HC RX 250 W HCPCS SELF ADMINISTERED DRUGS (ALT 637 FOR MEDICARE OP, ALT 636 FOR OP/ED): Performed by: INTERNAL MEDICINE

## 2024-11-16 PROCEDURE — 93005 ELECTROCARDIOGRAM TRACING: CPT

## 2024-11-16 PROCEDURE — 85610 PROTHROMBIN TIME: CPT

## 2024-11-16 PROCEDURE — 2500000001 HC RX 250 WO HCPCS SELF ADMINISTERED DRUGS (ALT 637 FOR MEDICARE OP): Performed by: INTERNAL MEDICINE

## 2024-11-16 PROCEDURE — 80053 COMPREHEN METABOLIC PANEL: CPT

## 2024-11-16 PROCEDURE — 2500000004 HC RX 250 GENERAL PHARMACY W/ HCPCS (ALT 636 FOR OP/ED): Performed by: INTERNAL MEDICINE

## 2024-11-16 PROCEDURE — 84484 ASSAY OF TROPONIN QUANT: CPT

## 2024-11-16 PROCEDURE — 85730 THROMBOPLASTIN TIME PARTIAL: CPT

## 2024-11-16 PROCEDURE — 70498 CT ANGIOGRAPHY NECK: CPT | Performed by: RADIOLOGY

## 2024-11-16 PROCEDURE — 71045 X-RAY EXAM CHEST 1 VIEW: CPT

## 2024-11-16 PROCEDURE — 96374 THER/PROPH/DIAG INJ IV PUSH: CPT | Mod: 59

## 2024-11-16 PROCEDURE — 2550000001 HC RX 255 CONTRASTS

## 2024-11-16 PROCEDURE — 71045 X-RAY EXAM CHEST 1 VIEW: CPT | Performed by: RADIOLOGY

## 2024-11-16 PROCEDURE — 73130 X-RAY EXAM OF HAND: CPT | Mod: RT

## 2024-11-16 PROCEDURE — 70450 CT HEAD/BRAIN W/O DYE: CPT | Performed by: STUDENT IN AN ORGANIZED HEALTH CARE EDUCATION/TRAINING PROGRAM

## 2024-11-16 PROCEDURE — 70498 CT ANGIOGRAPHY NECK: CPT

## 2024-11-16 RX ORDER — PANTOPRAZOLE SODIUM 40 MG/1
40 TABLET, DELAYED RELEASE ORAL DAILY
Status: DISCONTINUED | OUTPATIENT
Start: 2024-11-17 | End: 2024-11-18 | Stop reason: HOSPADM

## 2024-11-16 RX ORDER — METOPROLOL SUCCINATE 50 MG/1
50 TABLET, EXTENDED RELEASE ORAL DAILY
Status: DISCONTINUED | OUTPATIENT
Start: 2024-11-17 | End: 2024-11-18 | Stop reason: HOSPADM

## 2024-11-16 RX ORDER — ONDANSETRON HYDROCHLORIDE 2 MG/ML
4 INJECTION, SOLUTION INTRAVENOUS EVERY 6 HOURS PRN
Status: DISCONTINUED | OUTPATIENT
Start: 2024-11-16 | End: 2024-11-18 | Stop reason: HOSPADM

## 2024-11-16 RX ORDER — LANOLIN ALCOHOL/MO/W.PET/CERES
1000 CREAM (GRAM) TOPICAL DAILY
Status: DISCONTINUED | OUTPATIENT
Start: 2024-11-17 | End: 2024-11-18 | Stop reason: HOSPADM

## 2024-11-16 RX ORDER — VALACYCLOVIR HYDROCHLORIDE 500 MG/1
500 TABLET, FILM COATED ORAL DAILY
Status: DISCONTINUED | OUTPATIENT
Start: 2024-11-17 | End: 2024-11-18 | Stop reason: HOSPADM

## 2024-11-16 RX ORDER — PAROXETINE HYDROCHLORIDE 20 MG/1
20 TABLET, FILM COATED ORAL EVERY MORNING
Status: DISCONTINUED | OUTPATIENT
Start: 2024-11-17 | End: 2024-11-18 | Stop reason: HOSPADM

## 2024-11-16 RX ORDER — ONDANSETRON 4 MG/1
4 TABLET, FILM COATED ORAL EVERY 6 HOURS PRN
Status: DISCONTINUED | OUTPATIENT
Start: 2024-11-16 | End: 2024-11-18 | Stop reason: HOSPADM

## 2024-11-16 RX ORDER — ASPIRIN 300 MG/1
600 SUPPOSITORY RECTAL ONCE
Status: DISCONTINUED | OUTPATIENT
Start: 2024-11-16 | End: 2024-11-16

## 2024-11-16 RX ORDER — ACETAMINOPHEN 325 MG/1
650 TABLET ORAL EVERY 6 HOURS PRN
Status: DISCONTINUED | OUTPATIENT
Start: 2024-11-16 | End: 2024-11-18 | Stop reason: HOSPADM

## 2024-11-16 RX ORDER — ROSUVASTATIN CALCIUM 5 MG/1
5 TABLET, COATED ORAL NIGHTLY
Status: DISCONTINUED | OUTPATIENT
Start: 2024-11-16 | End: 2024-11-18 | Stop reason: HOSPADM

## 2024-11-16 RX ORDER — LORAZEPAM 2 MG/ML
0.5 INJECTION INTRAMUSCULAR ONCE
Status: COMPLETED | OUTPATIENT
Start: 2024-11-16 | End: 2024-11-16

## 2024-11-16 RX ORDER — SODIUM CHLORIDE 9 MG/ML
75 INJECTION, SOLUTION INTRAVENOUS CONTINUOUS
Status: DISCONTINUED | OUTPATIENT
Start: 2024-11-16 | End: 2024-11-18 | Stop reason: HOSPADM

## 2024-11-16 RX ADMIN — APIXABAN 5 MG: 5 TABLET, FILM COATED ORAL at 21:42

## 2024-11-16 RX ADMIN — IOHEXOL 75 ML: 350 INJECTION, SOLUTION INTRAVENOUS at 14:52

## 2024-11-16 RX ADMIN — ROSUVASTATIN CALCIUM 5 MG: 5 TABLET, COATED ORAL at 21:42

## 2024-11-16 RX ADMIN — LORAZEPAM 0.5 MG: 2 INJECTION INTRAMUSCULAR; INTRAVENOUS at 16:14

## 2024-11-16 RX ADMIN — SODIUM CHLORIDE 75 ML/HR: 900 INJECTION, SOLUTION INTRAVENOUS at 21:42

## 2024-11-16 RX ADMIN — SACUBITRIL AND VALSARTAN 1 TABLET: 24; 26 TABLET, FILM COATED ORAL at 21:42

## 2024-11-16 SDOH — HEALTH STABILITY: PHYSICAL HEALTH: ON AVERAGE, HOW MANY DAYS PER WEEK DO YOU ENGAGE IN MODERATE TO STRENUOUS EXERCISE (LIKE A BRISK WALK)?: 0 DAYS

## 2024-11-16 SDOH — HEALTH STABILITY: MENTAL HEALTH
DO YOU FEEL STRESS - TENSE, RESTLESS, NERVOUS, OR ANXIOUS, OR UNABLE TO SLEEP AT NIGHT BECAUSE YOUR MIND IS TROUBLED ALL THE TIME - THESE DAYS?: ONLY A LITTLE

## 2024-11-16 SDOH — ECONOMIC STABILITY: FOOD INSECURITY: WITHIN THE PAST 12 MONTHS, THE FOOD YOU BOUGHT JUST DIDN'T LAST AND YOU DIDN'T HAVE MONEY TO GET MORE.: NEVER TRUE

## 2024-11-16 SDOH — ECONOMIC STABILITY: HOUSING INSECURITY: IN THE LAST 12 MONTHS, WAS THERE A TIME WHEN YOU WERE NOT ABLE TO PAY THE MORTGAGE OR RENT ON TIME?: NO

## 2024-11-16 SDOH — ECONOMIC STABILITY: INCOME INSECURITY: IN THE PAST 12 MONTHS HAS THE ELECTRIC, GAS, OIL, OR WATER COMPANY THREATENED TO SHUT OFF SERVICES IN YOUR HOME?: NO

## 2024-11-16 SDOH — SOCIAL STABILITY: SOCIAL INSECURITY: ARE YOU MARRIED, WIDOWED, DIVORCED, SEPARATED, NEVER MARRIED, OR LIVING WITH A PARTNER?: DIVORCED

## 2024-11-16 SDOH — SOCIAL STABILITY: SOCIAL INSECURITY: WITHIN THE LAST YEAR, HAVE YOU BEEN HUMILIATED OR EMOTIONALLY ABUSED IN OTHER WAYS BY YOUR PARTNER OR EX-PARTNER?: NO

## 2024-11-16 SDOH — SOCIAL STABILITY: SOCIAL INSECURITY
WITHIN THE LAST YEAR, HAVE YOU BEEN KICKED, HIT, SLAPPED, OR OTHERWISE PHYSICALLY HURT BY YOUR PARTNER OR EX-PARTNER?: NO

## 2024-11-16 SDOH — HEALTH STABILITY: MENTAL HEALTH: HOW OFTEN DO YOU HAVE A DRINK CONTAINING ALCOHOL?: NEVER

## 2024-11-16 SDOH — SOCIAL STABILITY: SOCIAL INSECURITY: WITHIN THE LAST YEAR, HAVE YOU BEEN AFRAID OF YOUR PARTNER OR EX-PARTNER?: NO

## 2024-11-16 SDOH — SOCIAL STABILITY: SOCIAL NETWORK: HOW OFTEN DO YOU ATTEND CHURCH OR RELIGIOUS SERVICES?: NEVER

## 2024-11-16 SDOH — SOCIAL STABILITY: SOCIAL INSECURITY
WITHIN THE LAST YEAR, HAVE YOU BEEN RAPED OR FORCED TO HAVE ANY KIND OF SEXUAL ACTIVITY BY YOUR PARTNER OR EX-PARTNER?: NO

## 2024-11-16 SDOH — SOCIAL STABILITY: SOCIAL NETWORK: HOW OFTEN DO YOU ATTEND MEETINGS OF THE CLUBS OR ORGANIZATIONS YOU BELONG TO?: NEVER

## 2024-11-16 SDOH — SOCIAL STABILITY: SOCIAL NETWORK
DO YOU BELONG TO ANY CLUBS OR ORGANIZATIONS SUCH AS CHURCH GROUPS, UNIONS, FRATERNAL OR ATHLETIC GROUPS, OR SCHOOL GROUPS?: NO

## 2024-11-16 SDOH — ECONOMIC STABILITY: FOOD INSECURITY: WITHIN THE PAST 12 MONTHS, YOU WORRIED THAT YOUR FOOD WOULD RUN OUT BEFORE YOU GOT THE MONEY TO BUY MORE.: NEVER TRUE

## 2024-11-16 SDOH — ECONOMIC STABILITY: HOUSING INSECURITY: AT ANY TIME IN THE PAST 12 MONTHS, WERE YOU HOMELESS OR LIVING IN A SHELTER (INCLUDING NOW)?: NO

## 2024-11-16 SDOH — HEALTH STABILITY: PHYSICAL HEALTH
HOW OFTEN DO YOU NEED TO HAVE SOMEONE HELP YOU WHEN YOU READ INSTRUCTIONS, PAMPHLETS, OR OTHER WRITTEN MATERIAL FROM YOUR DOCTOR OR PHARMACY?: NEVER

## 2024-11-16 SDOH — HEALTH STABILITY: PHYSICAL HEALTH: ON AVERAGE, HOW MANY MINUTES DO YOU ENGAGE IN EXERCISE AT THIS LEVEL?: 0 MIN

## 2024-11-16 SDOH — SOCIAL STABILITY: SOCIAL NETWORK: HOW OFTEN DO YOU GET TOGETHER WITH FRIENDS OR RELATIVES?: TWICE A WEEK

## 2024-11-16 SDOH — HEALTH STABILITY: MENTAL HEALTH: HOW OFTEN DO YOU HAVE SIX OR MORE DRINKS ON ONE OCCASION?: NEVER

## 2024-11-16 SDOH — SOCIAL STABILITY: SOCIAL NETWORK
IN A TYPICAL WEEK, HOW MANY TIMES DO YOU TALK ON THE PHONE WITH FAMILY, FRIENDS, OR NEIGHBORS?: MORE THAN THREE TIMES A WEEK

## 2024-11-16 SDOH — HEALTH STABILITY: MENTAL HEALTH: HOW MANY DRINKS CONTAINING ALCOHOL DO YOU HAVE ON A TYPICAL DAY WHEN YOU ARE DRINKING?: PATIENT DOES NOT DRINK

## 2024-11-16 SDOH — ECONOMIC STABILITY: FOOD INSECURITY: HOW HARD IS IT FOR YOU TO PAY FOR THE VERY BASICS LIKE FOOD, HOUSING, MEDICAL CARE, AND HEATING?: NOT HARD AT ALL

## 2024-11-16 SDOH — ECONOMIC STABILITY: TRANSPORTATION INSECURITY: IN THE PAST 12 MONTHS, HAS LACK OF TRANSPORTATION KEPT YOU FROM MEDICAL APPOINTMENTS OR FROM GETTING MEDICATIONS?: NO

## 2024-11-16 SDOH — ECONOMIC STABILITY: HOUSING INSECURITY: IN THE PAST 12 MONTHS, HOW MANY TIMES HAVE YOU MOVED WHERE YOU WERE LIVING?: 0

## 2024-11-16 ASSESSMENT — LIFESTYLE VARIABLES
EVER HAD A DRINK FIRST THING IN THE MORNING TO STEADY YOUR NERVES TO GET RID OF A HANGOVER: NO
HAVE PEOPLE ANNOYED YOU BY CRITICIZING YOUR DRINKING: NO
TOTAL SCORE: 0
EVER FELT BAD OR GUILTY ABOUT YOUR DRINKING: NO
HAVE YOU EVER FELT YOU SHOULD CUT DOWN ON YOUR DRINKING: NO
AUDIT-C TOTAL SCORE: 0
SKIP TO QUESTIONS 9-10: 1

## 2024-11-16 ASSESSMENT — PAIN - FUNCTIONAL ASSESSMENT: PAIN_FUNCTIONAL_ASSESSMENT: 0-10

## 2024-11-16 ASSESSMENT — COLUMBIA-SUICIDE SEVERITY RATING SCALE - C-SSRS
1. IN THE PAST MONTH, HAVE YOU WISHED YOU WERE DEAD OR WISHED YOU COULD GO TO SLEEP AND NOT WAKE UP?: NO
2. HAVE YOU ACTUALLY HAD ANY THOUGHTS OF KILLING YOURSELF?: NO
6. HAVE YOU EVER DONE ANYTHING, STARTED TO DO ANYTHING, OR PREPARED TO DO ANYTHING TO END YOUR LIFE?: NO

## 2024-11-16 ASSESSMENT — ACTIVITIES OF DAILY LIVING (ADL): LACK_OF_TRANSPORTATION: NO

## 2024-11-16 NOTE — PROGRESS NOTES
Caroline Robles is a 64 y.o. female on day 0 of admission presenting with TIA (transient ischemic attack).       11/16/24 1709   Physical Activity   On average, how many days per week do you engage in moderate to strenuous exercise (like a brisk walk)? 0 days   On average, how many minutes do you engage in exercise at this level? 0 min   Financial Resource Strain   How hard is it for you to pay for the very basics like food, housing, medical care, and heating? Not hard   Housing Stability   In the last 12 months, was there a time when you were not able to pay the mortgage or rent on time? N   In the past 12 months, how many times have you moved where you were living? 0   At any time in the past 12 months, were you homeless or living in a shelter (including now)? N   Transportation Needs   In the past 12 months, has lack of transportation kept you from medical appointments or from getting medications? no   In the past 12 months, has lack of transportation kept you from meetings, work, or from getting things needed for daily living? No   Food Insecurity   Within the past 12 months, you worried that your food would run out before you got the money to buy more. Never true   Within the past 12 months, the food you bought just didn't last and you didn't have money to get more. Never true   Stress   Do you feel stress - tense, restless, nervous, or anxious, or unable to sleep at night because your mind is troubled all the time - these days? Only a littl   Social Connections   In a typical week, how many times do you talk on the phone with family, friends, or neighbors? More than 3   How often do you get together with friends or relatives? Twice   How often do you attend Restorationism or Voodoo services? Never   Do you belong to any clubs or organizations such as Restorationism groups, unions, fraternal or athletic groups, or school groups? No   How often do you attend meetings of the clubs or organizations you belong to? Never   Are  you , , , , never , or living with a partner?    Intimate Partner Violence   Within the last year, have you been afraid of your partner or ex-partner? No   Within the last year, have you been humiliated or emotionally abused in other ways by your partner or ex-partner? No   Within the last year, have you been kicked, hit, slapped, or otherwise physically hurt by your partner or ex-partner? No   Within the last year, have you been raped or forced to have any kind of sexual activity by your partner or ex-partner? No   Alcohol Use   Q1: How often do you have a drink containing alcohol? Never   Q2: How many drinks containing alcohol do you have on a typical day when you are drinking? None   Q3: How often do you have six or more drinks on one occasion? Never   Utilities   In the past 12 months has the electric, gas, oil, or water company threatened to shut off services in your home? No   Health Literacy   How often do you need to have someone help you when you read instructions, pamphlets, or other written material from your doctor or pharmacy? Never         Gisela Arenas RN

## 2024-11-16 NOTE — ED PROVIDER NOTES
HPI   Chief Complaint   Patient presents with    Altered Mental Status       Patient is a 64-year-old female presents emergency department for evaluation of aphasia and right hand injury.  Patient states that she was at [employer] when she went to put a lamp back on the shelf and missed the shelf and it started to tip.  She states that she went to catch it but instead slammed her right hand into the shelf.  She states when this occurred her hand was numb and she began feeling very dizzy and nauseous.  She states that she walked to the front of the store, but noticed that she was having trouble getting her words out and speaking and because of this EMS was called.  On her initial arrival she was having great difficulty getting her words out and patient initially sent to CT scan and code brain attack called.  On my reevaluation of patient upon return from CT scan her symptoms had resolved and she is feeling much improved.  She states that she is on Eliquis for history of atrial fibrillation and follows closely with cardiology.  She has no history of strokes that she is aware of.  She denies any vision changes, headaches, or other symptoms at this time.  She denies any recent illnesses, fevers, chills, lightheadedness, dizziness at this time.      History provided by:  Patient   used: No            Patient History   Past Medical History:   Diagnosis Date    Breast cancer (Multi)     Breast neoplasm, Tis (DCIS), left 2017    Disorder of bone, unspecified 2018    Disorder of bone and articular cartilage    Disorder of bone, unspecified 2017    Disorder of bone and articular cartilage    History of uterine scar from previous surgery     History of  section    Other conditions influencing health status     History of pregnancy    Personal history of irradiation 2017    Personal history of other diseases of the circulatory system 10/28/2014    History of hypertension    Personal  history of other diseases of the digestive system 10/28/2014    History of gastroesophageal reflux (GERD)    Personal history of other diseases of the respiratory system 2017    History of sinusitis    Personal history of other malignant neoplasm of skin     History of malignant neoplasm of skin     Past Surgical History:   Procedure Laterality Date    ABLATION OF DYSRHYTHMIC FOCUS  2021    BI MAMMO GUIDED BREAST LEFT LOCALIZATION Left 2017    BI MAMMO GUIDED LOCALIZATION BREAST LEFT 8/3/2017 [facility]    BREAST BIOPSY  2017    BREAST LUMPECTOMY Left     LT lumpectomy w/radiation     SECTION, CLASSIC  2017     Section    OTHER SURGICAL HISTORY  2019    Catheter ablation    OTHER SURGICAL HISTORY  2017    Mohs Micrographic Surgery Face Left Side    TONSILLECTOMY  2017    Tonsillectomy     Family History   Problem Relation Name Age of Onset    Other (Cardiac disorder) Mother      Lung cancer Father Lung cancer     Cancer Father Lung cancer     Alcohol abuse Other Family history     Cancer Other Family history     Pancreatic cancer Sibling      Cancer Sister . Pancreatic cancer      Social History     Tobacco Use    Smoking status: Never    Smokeless tobacco: Never   Substance Use Topics    Alcohol use: Never    Drug use: Never       Physical Exam   ED Triage Vitals   Temp Pulse Resp BP   -- -- -- --      SpO2 Temp src Heart Rate Source Patient Position   -- -- -- --      BP Location FiO2 (%)     -- --       Physical Exam  Constitutional:       Appearance: She is well-developed.   HENT:      Head: Normocephalic and atraumatic.   Eyes:      Extraocular Movements: Extraocular movements intact.      Pupils: Pupils are equal, round, and reactive to light.   Cardiovascular:      Rate and Rhythm: Normal rate and regular rhythm.   Pulmonary:      Effort: Pulmonary effort is normal.      Breath sounds: Normal breath sounds.   Abdominal:       General: Bowel sounds are normal.      Palpations: Abdomen is soft.   Musculoskeletal:         General: Normal range of motion.      Cervical back: Normal range of motion.   Neurological:      Mental Status: She is alert and oriented to person, place, and time.      Comments: NIHSS 0.           ED Course & MDM   ED Course as of 11/16/24 1600   Sat Nov 16, 2024   1524 Spoke with telestroke neurology Dr. Barillas.   [AF]      ED Course User Index  [AF] Bell Beckman PA-C         Diagnoses as of 11/16/24 1600   TIA (transient ischemic attack)   Closed nondisplaced fracture of distal phalanx of right middle finger, initial encounter                 No data recorded     Lisandro Coma Scale Score: 15 (11/16/24 1510 : Mabel Mckenzie RN)       NIH Stroke Scale: 0 (11/16/24 1509 : Bell Beckman PA-C)                   Medical Decision Making  Patient is a 64-year-old female presents emergency department for evaluation of episode of aphasia.    EKG was interpreted by attending physician and reviewed by me.    Lab work done today included CMP, CBC, troponins, PT/INR, APTT.  Lab work without significant abnormality.    Scans done today were interpreted/confirmed by radiologist and also interpreted by me which included chest x-ray, x-ray right hand, CT brain without contrast, CT angio head and neck.  X-ray of right hand shows nondisplaced fracture at the base of the distal phalanx of the third digit of the right hand on the PIP joint.  Chest x-ray shows no acute process in the lungs with enlarged heart but unchanged when compared to previous x-rays with moderate size hiatal hernia.  CT head without contrast shows no acute intracranial abnormality.  CT angio head and neck shows no evidence of large vessel occlusion with no evidence for stenosis dissection or aneurysm within the head or neck.    Medications given at today's visit include rectal aspirin, IV Ativan    I saw this patient in conjunction with Dr. Adams.  On initial  evaluation patient does have NIH of 1 for slight aphasia and difficulty with word finding.  On reevaluation 15 minutes later, patient has complete resolution of symptoms and is much improved with no focal deficits and NIH of 0.  Approximately 1 hour into patient's stay, she had recurrence of the aphasia, but it significantly improved with verbal reassurance and patient back to baseline with NIH of 0.  Given concern for possible component of anxiety contributing to recurrent symptoms, IV Ativan was ordered.  Given her symptoms I did speak with telestroke neurology Dr. Barillas to review case.  Given that patient is back to baseline with NIH of 0 with patient history of atrial fibrillation on Eliquis he agrees patient is not a TNK candidate and recommends admission to the hospital for further stroke workup and MRIs.  Patient was educated on results and is agreeable to admission for further stroke workup at this time for episode of aphasia.  As for her right middle finger she does have small fracture to the base of distal third phalanx.  Given this patient will be placed in finger splint immobilizer.  Aluminum finger splint immobilizer placed by nursing staff under my direct supervision and patient neurovascular intact following finger splint application.  Ultimately I did speak with Dr. Jo who is covering for patient's primary care provider Dr. Teixeira who is agreeable to admit patient for further management for further stroke workup.    The patient/family was counseled on clinical impression, expectations, and plan along with recommendations to admission.  All questions were answered and involved parties were understanding and agreeable to course of treatment.  Case was discussed with admitting physician and any consultants. Bed type, ED treatment and further ED workup decided by joint decision making with admitting team and any consultants. Patient stable for admission per my assessment and further management of patient  will be deferred to the inpatient setting.    ** Disclaimer:  Parts of this document were written utilizing a voice to text dictation software.  Note may contain minor transcription or typographical errors that were inadvertently transcribed by the computer software.        Procedure  Procedures     Bell Beckman PA-C  11/16/24 160

## 2024-11-16 NOTE — PROGRESS NOTES
Caroline Robles is a 64 y.o. female on day 0 of admission presenting with TIA (transient ischemic attack).       11/16/24 5874   ACS Disability Status   Are you deaf or do you have serious difficulty hearing? N   Are you blind or do you have serious difficulty seeing, even when wearing glasses? N   Because of a physical, mental, or emotional condition, do you have serious difficulty concentrating, remembering, or making decisions? (5 years old or older) N   Do you have serious difficulty walking or climbing stairs? N   Do you have serious difficulty dressing or bathing? N   Because of a physical, mental, or emotional condition, do you have serious difficulty doing errands alone such as visiting the doctor? N         Gisela Arenas RN

## 2024-11-16 NOTE — TELECONSULT
HPI:  64 y.o. female with history of Afib, on Eliquis, presents with acute onset of aphasia. symptoms improved over time. no motor deficits.     Last known Well: NIH Stroke Scale Reported: 1    Prior Functional Status (Modified [place] Scale):  0 No symptoms at all     Imaging Results:  Head CT: no bleed  Head/Neck CTA/P: no LVO     Assessment:   Working Diagnosis:   TIA     Recommendations:   IV tPA is not recommended. Rationale: nih 1     Patient is NOT a candidate for endovascular treatment.  Rationale: no lvo     Additional Recommendations:  admit for monitoring and MRI brain.   continue Eliquis     Consult completed by:  TELEPHONE communication was used to provide this telehealth service.  Time includes consultation with ED provider and extensive review of data- history, medical records, test results, and neuroimaging studies: 5 - 10 mins was spent in consultation

## 2024-11-16 NOTE — ED TRIAGE NOTES
Pt was at [employer] when she began struggling with speaking. States she got dizzy and was having a period of shortness of breath. Pt states this has now subsided, does not feel any dizziness at this time, denies sob at this time, Pts family called squad, pt alert and oriented, pt has pain in right hand finger from dropping a lamp on it, pt has hx of afib on eliquis. Pt aware of what happened, pt denies blurry vision, denies balance issues, denies headache at this time.

## 2024-11-16 NOTE — PROGRESS NOTES
Caroline Robles is a 64 y.o. female on day 0 of admission presenting with TIA (transient ischemic attack).       11/16/24 0423   Discharge Planning   Living Arrangements Alone   Support Systems Children   Assistance Needed none   Type of Residence Private residence   Number of Stairs to Enter Residence 1   Number of Stairs Within Residence 1  (one flight to upper level)   Do you have animals or pets at home? No   Who is requesting discharge planning? Provider   Home or Post Acute Services None   Expected Discharge Disposition Home   Does the patient need discharge transport arranged? No   Patient Choice   Provider Choice list and CMS website (https://medicare.gov/care-compare#search) for post-acute Quality and Resource Measure Data were provided and reviewed with: Other (Comment)  (no skilled needs)   Patient / Family choosing to utilize agency / facility established prior to hospitalization No   Stroke Family Assessment   Stroke Family Assessment Needed No   Intensity of Service   Intensity of Service 0-30 min         Gisela Arenas RN

## 2024-11-16 NOTE — PROGRESS NOTES
Attestation/Supervisory note for NAHUM Beckman      The patient is a 64-year-old female presenting to the emergency department by EMS from a local store for evaluation of a possible stroke.  The patient states that she was shopping and about 20 minutes prior to arrival she had a sudden onset of right-sided weakness.  She states that she felt like her right hand was numb.  She states that she dropped a lamp in the store.  She states that she then did not feel well.  She states that she felt lightheaded and felt like she might pass out.  She states that she felt somewhat anxious.  She states that she had to sit down.  She states that when somebody came to see if she was okay she felt like she could not find her words to express herself.  She denies any headache or visual changes.  She denies any difficulty swallowing.  She denies any neck or back pain.  No chest pain or shortness of breath.  No abdominal pain.  No nausea vomiting.  No diarrhea or constipation.  No urinary complaints.  No fever or chills.  No cough or congestion.  She does take Eliquis daily for atrial fibrillation and a history of SVT.  She states that she has had ablations in the past.  She denies any previous strokes.  She denies any history of CAD or ACS.  No history of PE or DVT.  She does not smoke or drink.  All pertinent positives and negatives are recorded above.  All other systems reviewed and otherwise negative.  Vital signs within normal limits.  Physical exam with a well-nourished well-developed female in no acute distress.  HEENT exam within normal limits.  She has no evidence of airway compromise or respiratory distress.  Abdominal exam is benign.  She does not have any gross motor, neurologic or vascular deficits on exam other than she did have some expressive aphasia when she first arrived in an NIH stroke scale score of 1 was given for this.  She had resolution of the expressive aphasia and had an NIH stroke scale score of 0 after return  from CT scan.      Code brain attack activated      tPA/TNK is not indicated given NIH stroke scale score of 0 at this time.      EKG with sinus rhythm at 81 bpm, occasional PVCs, right axis deviation, incomplete right bundle branch block pattern, normal ST segment, and a slight diffuse flattening of the T waves      Diagnostic labs with mild electrolyte imbalance but otherwise unremarkable.      Initial troponin  < 3.   Repeat troponin pending at the time of admission      XR hand right 3+ views   Final Result   Nondisplaced fracture base distal phalanx 3rd digit right hand at the   PIP joint.             MACRO:   None        Signed by: Natalia Zacarias 11/16/2024 3:44 PM   Dictation workstation:   [id number]      XR chest 1 view   Final Result   1. No acute process in the lungs.   2. Enlarged heart but unchanged compared to 06/28/2023.   3. Moderate size hiatal hernia.        MACRO:   None        Signed by: Natalia Zacarias 11/16/2024 3:45 PM   Dictation workstation:   [id number]      CT brain attack angio head and neck W and WO IV contrast   Final Result   No evidence of source vessel arterial occlusion, flow significant   stenosis, dissection, or aneurysm within the head and neck.        MACRO:   Incidental Finding:  There are few small hypoattenuating nodules   measuring less than 1.5 cm in the thyroid gland, likely benign.   (**-YCF-**)        Instructions:  If there are no clinical risk factors for thyroid   cancer, no further evaluation is recommended. (Managing Incidental   Thyroid Nodules Detected on Imaging: White Paper of the ACR   Incidental Thyroid Findings Committee. Charisma Taylor. et al. Journal   of the American College of Radiology,Volume 12, Issue 2, 143 - 150.)   THYROID.ACR.IF.3        Signed by: Carlos Eduardo Medel 11/16/2024 3:23 PM   Dictation workstation:   [id number]      CT brain attack head wo IV contrast   Final Result   No acute intracranial abnormality.        MACRO:   Dr. Cony Joshua  discussed the significance and urgency of this   critical finding via [employer] secure chat with Ms. RALPH VAZQUEZ on   11/16/2024 at 3:03 pm.  (**-RCF-**) Findings:  See findings.             Signed by: Cony Joshua 11/16/2024 3:04 PM   Dictation workstation:   [id number]           Patient does not have any evidence of ischemia on EKG or cardiac enzymes.  No events on telemetry.  She does not have any gross motor, neurologic or vascular deficits on exam at this time.  She initially did have some expressive aphasia that had resolved without intervention.  tPA/TNK was not indicated given an NIH stroke scale score of 1.  CT head without evidence of acute process.  No evidence of intracranial hemorrhage, mass effect or CVA.  CT angio head and neck with no evidence of large vessel occlusion.  The case was discussed with neurology, Dr. Barillas, and he agreed that tPA/TNK is not indicated.  He did agree with plan to admit for further management of possible TIA.  Chest x-ray about acute process.  There are some cardiomegaly but no evidence of CHF, pneumothorax or pneumonia.  No widening of the mediastinum.  Pulses are equal bilaterally.  The patient did have evidence of a nondisplaced fracture of the base of the distal phalanx of the third digit of the right hand at the PIP joint on imaging.  The patient did have a transient recurrence of the expressive aphasia and appeared very anxious at the time as well.  Her symptoms did resolve with verbal reassurance.  IV Ativan was also ordered.  Rectal aspirin was ordered as well.      The patient was admitted for further management of possible TIA.      Impression/diagnosis:  1.  Expressive aphasia/TIA  2.  Electrolyte imbalance  3.  Nondisplaced fracture of the third digit of the right hand at the PIP joint        Critical care time of  31 minutes billed for management of the code brain attack with assessment of the patient with NIH stroke scale, consideration of tPA inclusion and  exclusion criteria, consultation with neurology, consultation with the patient regarding results, monitoring the patient telemetry and arrangement for admission..  This time excludes time for billable procedures.      critical care time billed for by me is non concurrent with time billed for by NAHUM Beckman      I personally saw the patient and made/approve the management plan and take responsibility for the patient management.      I independently interpreted the following study (S) EKG and diagnostic labs      I personally discussed the patient's management with the patient      I reviewed the results of the diagnostic labs and diagnostic imaging.  Formal radiology read was completed by the radiologist.      Deneen Adams MD

## 2024-11-16 NOTE — PROGRESS NOTES
Caroline Robles is a 64 y.o. female on day 0 of admission presenting with TIA (transient ischemic attack).    Patient lives alone in a two story townhouse. No use of assistive devices, no issues using the stairs. She is independent with ADLs, daily tasks, works full-time, and drives. PCP is Dr DIO Teixeira.  ADOD 11/17/2024  Patient's symptom of aphasia has fully resolved. Plan is to discharge home with no needs, family will transport.     Gisela Arenas RN

## 2024-11-17 ENCOUNTER — APPOINTMENT (OUTPATIENT)
Dept: RADIOLOGY | Facility: HOSPITAL | Age: 64
End: 2024-11-17
Payer: COMMERCIAL

## 2024-11-17 VITALS
RESPIRATION RATE: 16 BRPM | HEIGHT: 66 IN | OXYGEN SATURATION: 94 % | BODY MASS INDEX: 25.71 KG/M2 | HEART RATE: 71 BPM | SYSTOLIC BLOOD PRESSURE: 105 MMHG | WEIGHT: 160 LBS | DIASTOLIC BLOOD PRESSURE: 45 MMHG | TEMPERATURE: 98.2 F

## 2024-11-17 PROBLEM — G45.9 TIA (TRANSIENT ISCHEMIC ATTACK): Status: ACTIVE | Noted: 2024-11-17

## 2024-11-17 PROBLEM — I49.9 ARRHYTHMIA: Status: ACTIVE | Noted: 2024-11-17

## 2024-11-17 PROCEDURE — 2500000002 HC RX 250 W HCPCS SELF ADMINISTERED DRUGS (ALT 637 FOR MEDICARE OP, ALT 636 FOR OP/ED): Performed by: INTERNAL MEDICINE

## 2024-11-17 PROCEDURE — 70551 MRI BRAIN STEM W/O DYE: CPT

## 2024-11-17 PROCEDURE — 70551 MRI BRAIN STEM W/O DYE: CPT | Performed by: STUDENT IN AN ORGANIZED HEALTH CARE EDUCATION/TRAINING PROGRAM

## 2024-11-17 PROCEDURE — 99222 1ST HOSP IP/OBS MODERATE 55: CPT | Performed by: NURSE PRACTITIONER

## 2024-11-17 PROCEDURE — 2500000004 HC RX 250 GENERAL PHARMACY W/ HCPCS (ALT 636 FOR OP/ED): Performed by: INTERNAL MEDICINE

## 2024-11-17 PROCEDURE — 99222 1ST HOSP IP/OBS MODERATE 55: CPT | Performed by: STUDENT IN AN ORGANIZED HEALTH CARE EDUCATION/TRAINING PROGRAM

## 2024-11-17 PROCEDURE — 2500000001 HC RX 250 WO HCPCS SELF ADMINISTERED DRUGS (ALT 637 FOR MEDICARE OP): Performed by: INTERNAL MEDICINE

## 2024-11-17 PROCEDURE — G0378 HOSPITAL OBSERVATION PER HR: HCPCS

## 2024-11-17 RX ADMIN — APIXABAN 5 MG: 5 TABLET, FILM COATED ORAL at 08:46

## 2024-11-17 RX ADMIN — APIXABAN 5 MG: 5 TABLET, FILM COATED ORAL at 21:48

## 2024-11-17 RX ADMIN — PANTOPRAZOLE SODIUM 40 MG: 40 TABLET, DELAYED RELEASE ORAL at 08:46

## 2024-11-17 RX ADMIN — SODIUM CHLORIDE 75 ML/HR: 900 INJECTION, SOLUTION INTRAVENOUS at 14:02

## 2024-11-17 RX ADMIN — VALACYCLOVIR HYDROCHLORIDE 500 MG: 500 TABLET, FILM COATED ORAL at 08:46

## 2024-11-17 RX ADMIN — PAROXETINE HYDROCHLORIDE 20 MG: 20 TABLET, FILM COATED ORAL at 09:27

## 2024-11-17 RX ADMIN — CYANOCOBALAMIN TAB 1000 MCG 1000 MCG: 1000 TAB at 08:46

## 2024-11-17 RX ADMIN — ROSUVASTATIN CALCIUM 5 MG: 5 TABLET, COATED ORAL at 21:48

## 2024-11-17 ASSESSMENT — ENCOUNTER SYMPTOMS
GASTROINTESTINAL NEGATIVE: 1
ACTIVITY CHANGE: 1
FATIGUE: 1
ENDOCRINE NEGATIVE: 1
WEAKNESS: 1
EYES NEGATIVE: 1
ALLERGIC/IMMUNOLOGIC NEGATIVE: 1
LIGHT-HEADEDNESS: 1
MUSCULOSKELETAL NEGATIVE: 1
CARDIOVASCULAR NEGATIVE: 1
HEMATOLOGIC/LYMPHATIC NEGATIVE: 1
FACIAL ASYMMETRY: 1
NERVOUS/ANXIOUS: 1
RESPIRATORY NEGATIVE: 1

## 2024-11-17 ASSESSMENT — PAIN SCALES - GENERAL: PAINLEVEL_OUTOF10: 0 - NO PAIN

## 2024-11-17 NOTE — CONSULTS
Inpatient consult to Neuro TeleStroke  Consult performed by: Mikaela Lang MD  Consult ordered by: Gela Jo MD        History Of Present Illness  Caroline Robles is a 64 y.o. female with hx of aflutter on eliquis and HF presenting with episode of speech disturbance. Stroke consulted for this episode.     Last known well:   Had stroke symptoms resolved at time of presentation: No    PT was at [employer] when pt had trouble putting lamp back into the shelf. She hurt her finger which she was frustrated about but was fairly calm about it. Then she felt she started getting dizzy and SOB, with nausea. She went up to the [employer] counter to ask for help -- then she had trouble getting words out. Eventually EMS called from [employer] and pt came to ED. The sx resolved in the ED after CT scan.     BP on presentation 108/55. HR 80. Glucose 121. NIHSS 1. Baseline independent, working.    Pt reports prior episode of confusion / forgetfulness, such as mistakenly thinking year is  than , but only very intermittent. Not interfering with daily living / work.     CTH was personally reviewed and was negative for any acute changes.  CTA head and neck negative for flow limiting stenosis.  MRI was also personally reviewed and was negative for any infarct. Minimal burden of white matter changes. 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       Past Medical History  Past Medical History:   Diagnosis Date    Breast cancer (Multi)     Breast neoplasm, Tis (DCIS), left 2017    Disorder of bone, unspecified 2018    Disorder of bone and articular cartilage    Disorder of bone, unspecified 2017    Disorder of bone and articular cartilage    History of uterine scar from previous surgery     History of  section    Other conditions influencing health status     History of pregnancy    Personal history of irradiation 2017    Personal history of other  diseases of the circulatory system 10/28/2014    History of hypertension    Personal history of other diseases of the digestive system 10/28/2014    History of gastroesophageal reflux (GERD)    Personal history of other diseases of the respiratory system 2017    History of sinusitis    Personal history of other malignant neoplasm of skin     History of malignant neoplasm of skin     Surgical History  Past Surgical History:   Procedure Laterality Date    ABLATION OF DYSRHYTHMIC FOCUS  2021    BI MAMMO GUIDED BREAST LEFT LOCALIZATION Left 2017    BI MAMMO GUIDED LOCALIZATION BREAST LEFT 8/3/2017 [facility]    BREAST BIOPSY  2017    BREAST LUMPECTOMY Left     LT lumpectomy w/radiation     SECTION, CLASSIC  2017     Section    OTHER SURGICAL HISTORY  2019    Catheter ablation    OTHER SURGICAL HISTORY  2017    Mohs Micrographic Surgery Face Left Side    TONSILLECTOMY  2017    Tonsillectomy     Social History  Social History     Tobacco Use    Smoking status: Never    Smokeless tobacco: Never   Substance Use Topics    Alcohol use: Never    Drug use: Never     Allergies  Patient has no active allergies.  Home Medications  (Not in a hospital admission)      Review of Systems  Neurological Exam  Physical Exam  MENTAL STATUS:  General appearance: in NAD  Orientation: Britton to self, time, place and condition   Language: Expression, repetition, naming, comprehension intact.   Concentration: Intact  Fund of knowledge: Appropriate    CRANIAL NERVES:  - Fundoscopic exam: Deferred   - II/III: PERRL  - II:  Visual fields intact to confrontation bilaterally   - III, IV, VI: EOMI to pursuit without nystagmus  - V: V1-V3 sensation intact bilaterally  - VII: Face muscles symmetric with smile and eye closure  - VIII: Intact to finger rub  - IX, X: Palate elevated symmetrically bilaterally, no hoarseness  - XI: 5/5 strength on shoulder shrugging bilaterally  - XII:  "Tongue midline without atrophy or fasciculation    MOTOR: Tone and bulk normal in all extremities  STRENGTH: 5/5 strength tested proximally and distally in BUE and BLE     REFLEXES: R L  Biceps   +2 +2  Brachioradialis +2 +2  Patellar   +2 +2  Achilles   +1 +1  Plantar   Mute mute   No clonus, frontal release signs or other pathologic reflexes present.     COORDINATION: Intact on finger to nose bl, intact on heel to shin bl, MARIELLA intact bl  SENSORY: Intact to light touch in BUE and BLE  GAIT: deferred       Last Recorded Vitals  Blood pressure 102/56, pulse 75, temperature 36.8 °C (98.2 °F), temperature source Oral, resp. rate 17, height 1.676 m (5' 6\"), weight 72.6 kg (160 lb), SpO2 (!) 92%.        Relevant Results      NIH Stroke Scale  1A. Level of Consciousness: Alert, Keenly Responsive  1B. Ask Month and Age: Both Questions Right  1C. Blink Eyes & Squeeze Hands: Performs Both Tasks  2. Best Gaze: Normal  3. Visual: No Visual Loss  4. Facial Palsy: Normal Symmetrical Movements  5A. Motor - Left Arm: No Drift  5B. Motor - Right Arm: No Drift  6A. Motor - Left Leg: No Drift  6B. Motor - Right Leg: No Drift  7. Limb Ataxia: Absent  8. Sensory Loss: Normal  9. Best Language: No Aphasia  10. Dysarthria: Normal  11. Extinction and Inattention: No Abnormality  NIH Stroke Scale: 0           [place] Coma Scale  Best Eye Response: Spontaneous  Best Verbal Response: Oriented  Best Motor Response: Follows commands  [place] Coma Scale Score: 15                MR brain wo IV contrast    Result Date: 11/17/2024  Interpreted By:  Santy Chandra, STUDY: MR BRAIN WO IV CONTRAST;  11/17/2024 1:29 pm   INDICATION: Signs/Symptoms:tia.     COMPARISON: CT head without contrast 11/16/2024, CTA head/neck 11/16/2024   ACCESSION NUMBER(S): [id number]   ORDERING CLINICIAN: SVETA JEFFERY   TECHNIQUE: Multiplanar, multi-sequence images of the brain were obtained without contrast.   FINDINGS:   Diffusion weighted images show no evidence " of acute ischemic infarct.   Minimal periventricular and subcortical hemispheric T2/FLAIR white matter hyperintensities are most compatible with chronic small vessel ischemic disease.   The major intracranial flow voids are preserved.   There is no acute intraparenchymal hemorrhage, mass, mass-effect, or an extra-axial fluid collection. There is no pathologic susceptibility artifact on GRE images.  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   The ventricular size and cerebral volume are age-concordant.   Normal morphology of midline structures. The craniovertebral junction is normal.   The orbits and globes are unremarkable.   The paranasal sinuses show no air-fluid levels or hemorrhage. Mild mucosal thickening of the maxillary sinuses and ethmoid sinuses.   Trace fluid in the bilateral mastoid air cells.       No acute ischemic infarct, acute hemorrhage, or intracranial mass effect.   Minimal burden of supratentorial chronic small vessel ischemic disease.   MACRO: None.   Signed by: Santy Chandra 11/17/2024 1:48 PM Dictation workstation:   [id number]   CT brain attack head wo IV contrast    Result Date: 11/16/2024  Interpreted By:  Cony Joshua, STUDY: CT BRAIN ATTACK HEAD WO IV CONTRAST;  11/16/2024 2:54 pm   INDICATION: Signs/Symptoms:Stroke Evaluation.     COMPARISON: None.   ACCESSION NUMBER(S): [id number]   ORDERING CLINICIAN: RALPH VAZQUEZ   TECHNIQUE: Noncontrast axial CT scan of head was performed. Angled reformats in brain and bone windows were generated. The images were reviewed in bone, brain, blood and soft tissue windows.   FINDINGS: CSF Spaces: The ventricles, sulci and basal cisterns are within normal limits. There is no extraaxial fluid collection.   Parenchyma: There is a 1 cm oval shaped hypodensity in the left periventricular white matter, likely  "favored to be related to prominent perivascular space versus sequela of chronic infarct. Otherwise, the grey-white differentiation is intact. There is no mass effect or midline shift.  There is no intracranial hemorrhage.   Calvarium: The calvarium is unremarkable.   Paranasal sinuses and mastoids: Visualized paranasal sinuses and mastoids are clear.       No acute intracranial abnormality.   MACRO: Dr. Cony Joshua discussed the significance and urgency of this critical finding via [facility] secure chat with Ms. RALPH VAZQUEZ on 11/16/2024 at 3:03 pm.  (**-RCF-**) Findings:  See findings.     Signed by: Cony Joshua 11/16/2024 3:04 PM Dictation workstation:   [id number]   No echocardiogram results found for the past 14 days        No results found for: \"BNP\"     I have personally reviewed the following imaging results MR brain wo IV contrast    Result Date: 11/17/2024  Interpreted By:  Santy Chandra, STUDY: MR BRAIN WO IV CONTRAST;  11/17/2024 1:29 pm   INDICATION: Signs/Symptoms:tia.     COMPARISON: CT head without contrast 11/16/2024, CTA head/neck 11/16/2024   ACCESSION NUMBER(S): [id number]   ORDERING CLINICIAN: SVETA JEFFERY   TECHNIQUE: Multiplanar, multi-sequence images of the brain were obtained without contrast.   FINDINGS:   Diffusion weighted images show no evidence of acute ischemic infarct.   Minimal periventricular and subcortical hemispheric T2/FLAIR white matter hyperintensities are most compatible with chronic small vessel ischemic disease.   The major intracranial flow voids are preserved.   There is no acute intraparenchymal hemorrhage, mass, mass-effect, or an extra-axial fluid collection. There is no pathologic susceptibility artifact on GRE images.  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   The ventricular size and cerebral volume are " age-concordant.   Normal morphology of midline structures. The craniovertebral junction is normal.   The orbits and globes are unremarkable.   The paranasal sinuses show no air-fluid levels or hemorrhage. Mild mucosal thickening of the maxillary sinuses and ethmoid sinuses.   Trace fluid in the bilateral mastoid air cells.       No acute ischemic infarct, acute hemorrhage, or intracranial mass effect.   Minimal burden of supratentorial chronic small vessel ischemic disease.   MACRO: None.   Signed by: Santy Chandra 11/17/2024 1:48 PM Dictation workstation:   [id number]    XR chest 1 view    Result Date: 11/16/2024  Interpreted By:  Natalia Zacarias, STUDY: XR CHEST 1 VIEW;  11/16/2024 3:11 pm   INDICATION: Signs/Symptoms:tia.   COMPARISON: 06/28/2023   ACCESSION NUMBER(S): [id number]   ORDERING CLINICIAN: RALPH VAZQUEZ   FINDINGS: CARDIOMEDIASTINAL SILHOUETTE: The heart is enlarged but unchanged. There is a moderate-sized hiatal hernia.   LUNGS: Lungs are clear.   ABDOMEN: No remarkable upper abdominal findings.     BONES: No acute osseous changes.       1. No acute process in the lungs. 2. Enlarged heart but unchanged compared to 06/28/2023. 3. Moderate size hiatal hernia.   MACRO: None   Signed by: Natalia Zacarias 11/16/2024 3:45 PM Dictation workstation:   [id number]    XR hand right 3+ views    Result Date: 11/16/2024  Interpreted By:  Natalia Zacarias, STUDY: XR HAND RIGHT 3+ VIEWS; ;  11/16/2024 3:11 pm   INDICATION: Signs/Symptoms:injury.     COMPARISON: None.   ACCESSION NUMBER(S): [id number]   ORDERING CLINICIAN: RALPH VAZQUEZ   FINDINGS: Three views right hand: There is a nondisplaced avulsion fracture of the base of the distal phalanx of the 3rd digit at the DIP joint. There is an associated flexion deformity. The bones are osteopenic.       Nondisplaced fracture base distal phalanx 3rd digit right hand at the PIP joint.     MACRO: None   Signed by: Natalia Zacarias 11/16/2024 3:44 PM Dictation  workstation:   [id number]    CT brain attack angio head and neck W and WO IV contrast    Result Date: 11/16/2024  Interpreted By:  Carlos Eduardo Medel, STUDY: CT BRAIN ATTACK ANGIO HEAD AND NECK W AND WO IV CONTRAST performed 11/16/2024   INDICATION: Signs/Symptoms:aphasia, abrupt onset     COMPARISON: CT head dated 11/16/2024.   ACCESSION NUMBER(S): [id number]   ORDERING CLINICIAN: RALPH VAZQUEZ   TECHNIQUE: Axial CTA imaging was obtained through the head and neck following the administration of 75 ML Omnipaque 350 intravenous contrast. Coronal, sagittal, MIP, and 3D reconstructions were obtained. 3D reconstructions were rendered using a separate 3D workstation.   FINDINGS: NECK:   No suspicious lymphadenopathy. Patent airway. Salivary glands are unremarkable. Subcentimeter nodules within the nonenlarged left lobe of the thyroid gland. No acute osseous abnormality of the cervical spine. Trace atelectasis. Mild mosaic attenuation of the lung parenchyma may reflect elements of air trapping.   VASCULAR FINDINGS:   Aorta and subclavian arteries:Brachiocephalic and left common carotid artery share a common origin, an anatomic variant. Subclavian arteries are patent without flow significant stenosis.   Common carotid arteries: Normal bilaterally.   External carotid arteries: Normal bilaterally.   Internal carotid arteries: Patent bilaterally. No significant atherosclerotic involvement of the carotid bifurcations. No NASCET stenosis bilaterally. Intracranial segments are normal in course, contour, and caliber.   Anterior cerebral arteries: Normal bilaterally.   Middle cerebral arteries: Normal bilaterally.   Vertebral arteries: Normal bilaterally. Left dominant vertebral artery with hypoplastic right vertebral artery.   Basilar artery: Normal.   Posterior communicating arteries: Diminutive versus absent bilaterally.   Posterior cerebral arteries: Normal bilaterally.       No evidence of source vessel arterial occlusion,  flow significant stenosis, dissection, or aneurysm within the head and neck.   MACRO: Incidental Finding:  There are few small hypoattenuating nodules measuring less than 1.5 cm in the thyroid gland, likely benign. (**-YCF-**)   Instructions:  If there are no clinical risk factors for thyroid cancer, no further evaluation is recommended. (Managing Incidental Thyroid Nodules Detected on Imaging: White Paper of the ACR Incidental Thyroid Findings Committee. Charisma Taylor. et al. Journal of the American College of Radiology,Volume 12, Issue 2, 143 - 150.) THYROID.ACR.IF.3   Signed by: Carlos Eduardo Medel 11/16/2024 3:23 PM Dictation workstation:   [id number]    CT brain attack head wo IV contrast    Result Date: 11/16/2024  Interpreted By:  Cony Joshua, STUDY: CT BRAIN ATTACK HEAD WO IV CONTRAST;  11/16/2024 2:54 pm   INDICATION: Signs/Symptoms:Stroke Evaluation.     COMPARISON: None.   ACCESSION NUMBER(S): [id number]   ORDERING CLINICIAN: RALPH VAZQUEZ   TECHNIQUE: Noncontrast axial CT scan of head was performed. Angled reformats in brain and bone windows were generated. The images were reviewed in bone, brain, blood and soft tissue windows.   FINDINGS: CSF Spaces: The ventricles, sulci and basal cisterns are within normal limits. There is no extraaxial fluid collection.   Parenchyma: There is a 1 cm oval shaped hypodensity in the left periventricular white matter, likely favored to be related to prominent perivascular space versus sequela of chronic infarct. Otherwise, the grey-white differentiation is intact. There is no mass effect or midline shift.  There is no intracranial hemorrhage.   Calvarium: The calvarium is unremarkable.   Paranasal sinuses and mastoids: Visualized paranasal sinuses and mastoids are clear.       No acute intracranial abnormality.   MACRO: Dr. Cony Joshua discussed the significance and urgency of this critical finding via [employer] secure chat with Ms. RALPH VAZQUEZ on 11/16/2024 at 3:03  pm.  (**-RCF-**) Findings:  See findings.     Signed by: Cony Joshua 11/16/2024 3:04 PM Dictation workstation:   [id number]  .     Stroke Alert CT/MRI review: Actual date and time 3pm by Dr. Barillas 11/16      IV Thrombolysis IV Thrombolysis Checklist      IV Thrombolysis Given: No; Thrombolysis contraindication reason: Neurologic signs are mild and judged to be non-disabling       Assessment/Plan   Assessment & Plan  Vasovagal episode      Caroline Robles is a 64 y.o. female with hx of aflutter on eliquis along with extensive cardiac hx presenting with episode of speech disturbance in the setting of dizziness / SOB / nausea after finger injury. Episode is not consistent with stroke. Moreover sx lasted for 40min and MRI was still negative for stroke indicating that this was likely not a cerebrovascular event.     Discussed that speech disturbance can happen with any reduced blood flow or oxygenation to the brain rather than particular artery being blocked, which may be the case here. Reassurance provided.     Dx. Not a stroke or TIA     Recs:  - no further acute neurological evaluation necessary   - no need for follow up with neurology   - Aflutter / HF care per cardiology       I spent 60 minutes in the professional and overall care of this patient.      Mikaela Lang MD

## 2024-11-17 NOTE — CONSULTS
Inpatient consult to Cardiology  Consult performed by: Win Sheth, SURAJ-CNP  Consult ordered by: Gela Jo MD  Reason for consult: Near syncope, dizziness, rule out stroke/TIA        History Of Present Illness:    Caroline Robles is a 64 y.o. female presenting with strokelike symptoms.  Current with Dr. Snider.  Past medical history of AVNRT s/p RFA (2021), SSS, h/o PVCs, stage C systolic HF/HFrEF currently without an ICD, aflutter on DOAC therapy, dyslipidemia, GERD.  Patient was at [employer] shopping, went to put a lamp back on the shelf, lab slip, patient caught it striking her hand against the shelf.  Pain and then numbness.  Upon leaving the store she felt dizzy and lightheaded as if she was going to pass out.  She did not syncopize.  Additionally she felt as if she was having difficulty expressing her speech.  Called EMS.  EKG in emergency department a sinus rhythm with PVCs and incomplete right bundle branch block.  Troponin initial CT brain and CT brain angio without acute findings.  With value less than 3.  Creatinine 0.86 with a potassium 3.4 and a sodium of 136 with hemoglobin of 12.6.   after return from CT her symptoms resolved.  Code brain attack was initiated, neurology assessed.  No indication for TNK.  Patient received IV fluids and was admitted on telemetry for further testing and treatment.  Note patient states compliance with her outpatient medications [place] complaints of chest pain or palpitations feeling of rapid heart rate.     Last Recorded Vitals:  Vitals:    11/17/24 0700 11/17/24 0800 11/17/24 0852 11/17/24 0900   BP: 98/56  86/57 100/53   Pulse: 62 81 74 63   Resp: 15 19  18   Temp:       TempSrc:       SpO2: 97%   (!) 93%   Weight:       Height:           Last Labs:  CBC - 11/16/2024:  3:05 PM  6.3 12.6 243    36.9      CMP - 11/16/2024:  3:05 PM  8.7 6.2 19 --- 0.5   _ 3.7 14 56      PTT - 11/16/2024:  3:05 PM  1.1   11.1 25.7     Troponin I, High Sensitivity    Date/Time Value Ref Range Status   2024 03:05 PM <3 0 - 13 ng/L Final     LDL Calculated   Date/Time Value Ref Range Status   2024 01:26  (H) <=99 mg/dL Final     Comment:                                 Near   Borderline      AGE      Desirable  Optimal    High     High     Very High     0-19 Y     0 - 109     ---    110-129   >/= 130     ----    20-24 Y     0 - 119     ---    120-159   >/= 160     ----      >24 Y     0 -  99   100-129  130-159   160-189     >/=190     2023 05:40 AM 94 65 - 130 MG/DL Final     VLDL   Date/Time Value Ref Range Status   2024 01:26 PM 43 (H) 0 - 40 mg/dL Final   2020 10:00 AM 28 0 - 40 mg/dL Final   10/26/2018 09:42 AM 35 0 - 40 mg/dL Final   2018 10:43 AM 57 (H) 0 - 40 mg/dL Final      Last I/O:  No intake/output data recorded.    Past Cardiology Tests (Last 3 Years):  EK/16: Sinus rhythm with PVCs and incomplete right bundle branch block    Echo: 2023: Ejection fraction 45 to 50% with a global hypokinesis and a mild mitral valve regurgitation      Past Medical History:  She has a past medical history of Breast cancer (Multi), Breast neoplasm, Tis (DCIS), left (2017), Disorder of bone, unspecified (2018), Disorder of bone, unspecified (2017), History of uterine scar from previous surgery, Other conditions influencing health status, Personal history of irradiation (2017), Personal history of other diseases of the circulatory system (10/28/2014), Personal history of other diseases of the digestive system (10/28/2014), Personal history of other diseases of the respiratory system (2017), and Personal history of other malignant neoplasm of skin.    Past Surgical History:  She has a past surgical history that includes Other surgical history (2019);  section, classic (2017); Tonsillectomy (2017); Other surgical history (2017); BI mammo guided breast left localization (Left,  2017); Breast lumpectomy (Left, ); Ablation of dysrhythmic focus (2021); and Breast biopsy (2017).      Social History:  She reports that she has never smoked. She has never used smokeless tobacco. She reports that she does not drink alcohol and does not use drugs.    Family History:  Family History   Problem Relation Name Age of Onset    Other (Cardiac disorder) Mother      Lung cancer Father Lung cancer     Cancer Father Lung cancer     Alcohol abuse Other Family history     Cancer Other Family history     Pancreatic cancer Sibling      Cancer Sister . Pancreatic cancer         Allergies:  Patient has no known allergies.    Inpatient Medications:  Scheduled medications   Medication Dose Route Frequency    apixaban  5 mg oral BID    cyanocobalamin  1,000 mcg oral Daily    metoprolol succinate XL  50 mg oral Daily    pantoprazole  40 mg oral Daily    PARoxetine  20 mg oral q AM    rosuvastatin  5 mg oral Nightly    [Held by provider] sacubitriL-valsartan  1 tablet oral BID    valACYclovir  500 mg oral Daily     PRN medications   Medication    acetaminophen    ondansetron    ondansetron     Continuous Medications   Medication Dose Last Rate    sodium chloride 0.9%  75 mL/hr 75 mL/hr (24 [phone])     Outpatient Medications:  Current Outpatient Medications   Medication Instructions    cyanocobalamin (VITAMIN B-12) 1,000 mcg, oral, Daily    Eliquis 5 mg, oral, 2 times daily    esomeprazole (NEXIUM) 20 mg, oral, Daily before breakfast    metoprolol succinate XL (TOPROL-XL) 50 mg, oral, Daily    PARoxetine (PAXIL) 20 mg, oral, Every morning    rosuvastatin (CRESTOR) 5 mg, oral, Daily    sacubitriL-valsartan (Entresto) 24-26 mg tablet 0.5 tablets, oral, 2 times daily    valACYclovir (VALTREX) 500 mg, oral, Daily       Physical Exam:  General: alert, oriented x 3, very pleasant, no facial droop or expressive aphasia  HEENT: normal cephalic, atraumatic, no scleral icterus,  Neck: No JVD, bruit or  thrill, masses or tenderness   Heart: S1/S2, Rate 60, Rhythm regular, no s3 or s4, no murmur, thrill, or heaves at PMI.   Lungs: Clear, equal expansion and excursion, no wheezes, crackles, rhales or rhonci.  Room air.  No conversational dyspnea appreciated.  No tachypnea.  No pain with deep aspiration   Abdomen: bowel sounds x 4, soft, non-tender to light and deep palpation, No masses, guarding, or CVA tenderness   Genitourinary: deferred   Extremities: No significant upper or lower extremity edema appreciated.       Assessment/Plan     Rule out stroke versus TIA  Dizziness  Chronic systolic heart failure; stage C  History of sick sinus syndrome  History of AVNRT with a radiofrequency ablation in 2021  Paroxysmal atrial flutter  Dyslipidemia    Overall impression:    11/17: As above, strokelike episode prompting EMS call.  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  She does note that she was placed on Entresto therapy about a year ago and her ejection fraction has improved from the low at 35% to most recent of 45 to 50%.  She does have a global hypokinesis and some mild mitral valve digitation.  The patient reports undergoing a cardiac catheterization to determine if she had ischemic cardiomyopathy less than 2 months ago which revealed normal coronary arteries without significant flow-limiting disease.  She reports no chest pain.  Is breathing comfortably room air.  She was concerned given her symptoms of slurred speech and difficulty expressing words that she was having a stroke.  CT brain and CT angio without acute findings. at this point given the patient's medications and history I am most suspicious that the patient had a near syncopal episode relative to hypotension.  She states on the Entresto her blood pressure does run low with an average systolic of 100.  She also reports that that morning she did not  have any fluids and the day before she drank significant less fluids than usual as she was very busy at work.  Do not believe we need to repeat an echocardiogram.  From our records the last echocardiogram was completed June 2023, however she states she has had an echo less than a few months ago with further improved ejection fraction above 50%.  At this point I am holding her Entresto.  She has also further clarified that she is taking a half a pill of Entresto 24/26 twice daily.  I will clarify this in the medication system.  She also notes that she is being considered for a pacemaker/defibrillator in the outpatient setting although she will likely not qualify now for defibrillator given her improved ejection fraction she does carry a history of sick sinus syndrome. Wait results of MRI.  Would continue with IV hydration.  Parameters of been placed on metoprolol.  She continues on apixaban for stroke risk reduction as there is no evidence of bleeding on the CT of the brain.  Will follow with you.      Code Status:  Full Code    I spent 60 minutes in the professional and overall care of this patient.        Win Sheth, APRN-CNP   EMT/paramedic

## 2024-11-17 NOTE — H&P
History Of Present Illness  Caroline Robles is a 64 y.o. female presenting with syncopal episode, bradycardia at 39 while shopping at [employer].     Past Medical History  She has a past medical history of Breast cancer (Multi), Breast neoplasm, Tis (DCIS), left (2017), Disorder of bone, unspecified (2018), Disorder of bone, unspecified (2017), History of uterine scar from previous surgery, Other conditions influencing health status, Personal history of irradiation (2017), Personal history of other diseases of the circulatory system (10/28/2014), Personal history of other diseases of the digestive system (10/28/2014), Personal history of other diseases of the respiratory system (2017), and Personal history of other malignant neoplasm of skin.    Surgical History  She has a past surgical history that includes Other surgical history (2019);  section, classic (2017); Tonsillectomy (2017); Other surgical history (2017); BI mammo guided breast left localization (Left, 2017); Breast lumpectomy (Left, ); Ablation of dysrhythmic focus (2021); and Breast biopsy (2017).     Social History  She reports that she has never smoked. She has never used smokeless tobacco. She reports that she does not drink alcohol and does not use drugs.    Family History  Family History   Problem Relation Name Age of Onset    Other (Cardiac disorder) Mother      Lung cancer Father Lung cancer     Cancer Father Lung cancer     Alcohol abuse Other Family history     Cancer Other Family history     Pancreatic cancer Sibling      Cancer Sister . Pancreatic cancer         Allergies  Patient has no known allergies.    Review of Systems   Constitutional:  Positive for activity change and fatigue.   HENT: Negative.     Eyes: Negative.    Respiratory: Negative.     Cardiovascular: Negative.    Gastrointestinal: Negative.    Endocrine: Negative.    Genitourinary: Negative.     Musculoskeletal: Negative.         Right middle finger fx swelling   Skin: Negative.    Allergic/Immunologic: Negative.    Neurological:  Positive for facial asymmetry, weakness and light-headedness.   Hematological: Negative.    Psychiatric/Behavioral:  The patient is nervous/anxious.         Physical Exam  Constitutional:       Appearance: She is obese.   HENT:      Head: Normocephalic and atraumatic.      Nose: Nose normal.      Mouth/Throat:      Mouth: Mucous membranes are moist.   Eyes:      Extraocular Movements: Extraocular movements intact.      Conjunctiva/sclera: Conjunctivae normal.      Pupils: Pupils are equal, round, and reactive to light.   Cardiovascular:      Rate and Rhythm: Bradycardia present.      Comments: Bp low 86/57  Pulmonary:      Effort: Pulmonary effort is normal.      Breath sounds: Normal breath sounds.   Abdominal:      General: Bowel sounds are normal.   Musculoskeletal:      Cervical back: Normal range of motion and neck supple.      Comments: Right middle finger fx and blue discoloration, bend distal phalanx   Skin:     General: Skin is warm and dry.      Capillary Refill: Capillary refill takes more than 3 seconds.   Neurological:      General: No focal deficit present.      Mental Status: She is alert and oriented to person, place, and time.      Comments: Remote left facial droop, hc slurred speech yesterday and x 1 last night per nurse   Psychiatric:         Mood and Affect: Mood normal.         Behavior: Behavior normal.      Comments: anxious          Last Recorded Vitals  BP 86/57   Pulse 74   Temp 36.8 °C (98.2 °F) (Oral)   Resp 15   Wt 72.6 kg (160 lb)   SpO2 97%     Relevant Results             Assessment/Plan   Assessment & Plan  TIA (transient ischemic attack)      Bradycardia,, remote syncope, anxiety, slurred speech, mild left facial droop.       Gela Jo MD

## 2024-11-18 ENCOUNTER — APPOINTMENT (OUTPATIENT)
Dept: RADIOLOGY | Facility: HOSPITAL | Age: 64
End: 2024-11-18
Payer: COMMERCIAL

## 2024-11-18 VITALS
WEIGHT: 160 LBS | TEMPERATURE: 98.2 F | RESPIRATION RATE: 16 BRPM | OXYGEN SATURATION: 98 % | HEART RATE: 74 BPM | DIASTOLIC BLOOD PRESSURE: 58 MMHG | HEIGHT: 66 IN | SYSTOLIC BLOOD PRESSURE: 112 MMHG | BODY MASS INDEX: 25.71 KG/M2

## 2024-11-18 LAB
ATRIAL RATE: 81 BPM
P AXIS: 42 DEGREES
P OFFSET: 171 MS
P ONSET: 111 MS
PR INTERVAL: 196 MS
Q ONSET: 209 MS
QRS COUNT: 13 BEATS
QRS DURATION: 98 MS
QT INTERVAL: 416 MS
QTC CALCULATION(BAZETT): 483 MS
QTC FREDERICIA: 459 MS
R AXIS: 133 DEGREES
T AXIS: 18 DEGREES
T OFFSET: 417 MS
VENTRICULAR RATE: 81 BPM

## 2024-11-18 PROCEDURE — 2500000001 HC RX 250 WO HCPCS SELF ADMINISTERED DRUGS (ALT 637 FOR MEDICARE OP): Performed by: NURSE PRACTITIONER

## 2024-11-18 PROCEDURE — 2500000001 HC RX 250 WO HCPCS SELF ADMINISTERED DRUGS (ALT 637 FOR MEDICARE OP): Performed by: INTERNAL MEDICINE

## 2024-11-18 PROCEDURE — 93880 EXTRACRANIAL BILAT STUDY: CPT

## 2024-11-18 PROCEDURE — 99232 SBSQ HOSP IP/OBS MODERATE 35: CPT | Performed by: NURSE PRACTITIONER

## 2024-11-18 PROCEDURE — 2500000002 HC RX 250 W HCPCS SELF ADMINISTERED DRUGS (ALT 637 FOR MEDICARE OP, ALT 636 FOR OP/ED): Performed by: INTERNAL MEDICINE

## 2024-11-18 PROCEDURE — 93880 EXTRACRANIAL BILAT STUDY: CPT | Performed by: RADIOLOGY

## 2024-11-18 PROCEDURE — G0378 HOSPITAL OBSERVATION PER HR: HCPCS

## 2024-11-18 RX ADMIN — PANTOPRAZOLE SODIUM 40 MG: 40 TABLET, DELAYED RELEASE ORAL at 09:06

## 2024-11-18 RX ADMIN — APIXABAN 5 MG: 5 TABLET, FILM COATED ORAL at 09:06

## 2024-11-18 RX ADMIN — CYANOCOBALAMIN TAB 1000 MCG 1000 MCG: 1000 TAB at 09:06

## 2024-11-18 RX ADMIN — PAROXETINE HYDROCHLORIDE 20 MG: 20 TABLET, FILM COATED ORAL at 09:06

## 2024-11-18 RX ADMIN — SACUBITRIL AND VALSARTAN 0.5 TABLET: 24; 26 TABLET, FILM COATED ORAL at 09:15

## 2024-11-18 RX ADMIN — VALACYCLOVIR HYDROCHLORIDE 500 MG: 500 TABLET, FILM COATED ORAL at 09:06

## 2024-11-18 RX ADMIN — METOPROLOL SUCCINATE 50 MG: 50 TABLET, EXTENDED RELEASE ORAL at 09:06

## 2024-11-18 NOTE — PROGRESS NOTES
"Caroline Robles is a 64 y.o. female on day 2 of admission presenting with TIA (transient ischemic attack).    Subjective   Alert and oriented x 3.  Denies complaints chest pain or pressure palpitations or feeling of rapid heart rate.       Objective     Physical Exam  Vitals and nursing note reviewed.   Constitutional:       General: She is not in acute distress.     Appearance: She is not ill-appearing or toxic-appearing.   HENT:      Head: Normocephalic and atraumatic.      Nose: Nose normal.      Mouth/Throat:      Mouth: Mucous membranes are moist.      Pharynx: Oropharynx is clear.   Cardiovascular:      Rate and Rhythm: Normal rate and regular rhythm.      Pulses: Normal pulses.      Heart sounds: Normal heart sounds. No murmur heard.     No friction rub. No gallop.   Pulmonary:      Effort: Pulmonary effort is normal.      Breath sounds: Normal breath sounds.   Abdominal:      General: Bowel sounds are normal.      Palpations: Abdomen is soft.   Musculoskeletal:         General: Normal range of motion.      Cervical back: Normal range of motion.      Right lower leg: No edema.      Left lower leg: No edema.   Skin:     General: Skin is warm and dry.      Capillary Refill: Capillary refill takes less than 2 seconds.   Neurological:      Mental Status: She is alert and oriented to person, place, and time. Mental status is at baseline.   Psychiatric:         Mood and Affect: Mood normal.         Behavior: Behavior normal.         Thought Content: Thought content normal.         Judgment: Judgment normal.         Last Recorded Vitals  Blood pressure 124/56, pulse 74, temperature 36.8 °C (98.2 °F), temperature source Oral, resp. rate 20, height 1.676 m (5' 6\"), weight 72.6 kg (160 lb), SpO2 (!) 93%.  Intake/Output last 3 Shifts:  No intake/output data recorded.    Relevant Results  Results for orders placed or performed during the hospital encounter of 11/16/24 (from the past 24 hours)   Carotid duplex bilateral "   Result Value Ref Range    BSA 1.84 m2           Assessment/Plan   Assessment & Plan  TIA (transient ischemic attack)    Vasovagal episode    Arrhythmia      Rule out stroke versus TIA  Dizziness  Chronic systolic heart failure; stage C  History of sick sinus syndrome  History of AVNRT with a radiofrequency ablation in 2021  Paroxysmal atrial flutter  Dyslipidemia     Overall impression:     11/17: As above, strokelike episode prompting EMS call.  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  She does note that she was placed on Entresto therapy about a year ago and her ejection fraction has improved from the low at 35% to most recent of 45 to 50%.  She does have a global hypokinesis and some mild mitral valve digitation.  The patient reports undergoing a cardiac catheterization to determine if she had ischemic cardiomyopathy less than 2 months ago which revealed normal coronary arteries without significant flow-limiting disease.  She reports no chest pain.  Is breathing comfortably room air.  She was concerned given her symptoms of slurred speech and difficulty expressing words that she was having a stroke.  CT brain and CT angio without acute findings. at this point given the patient's medications and history I am most suspicious that the patient had a near syncopal episode relative to hypotension.  She states on the Entresto her blood pressure does run low with an average systolic of 100.  She also reports that that morning she did not have any fluids and the day before she drank significant less fluids than usual as she was very busy at work.  Do not believe we need to repeat an echocardiogram.  From our records the last echocardiogram was completed June 2023, however she states she has had an echo less than a few months ago with further improved ejection fraction above 50%.  At this point I am holding her  Entresto.  She has also further clarified that she is taking a half a pill of Entresto 24/26 twice daily.  I will clarify this in the medication system.  She also notes that she is being considered for a pacemaker/defibrillator in the outpatient setting although she will likely not qualify now for defibrillator given her improved ejection fraction she does carry a history of sick sinus syndrome. Wait results of MRI.  Would continue with IV hydration.  Parameters of been placed on metoprolol.  She continues on apixaban for stroke risk reduction as there is no evidence of bleeding on the CT of the brain.  Will follow with you.    11/18: Stable overnight.  No further episodes of strokelike activity.  MRI of the brain is negative for acute stroke.  Has been evaluated by neurology.  They have signed off.  On the cardiac monitor remained in a sinus rhythm with occasional mild sinus bradycardia.  Her blood pressures are stable with most recent 125/55.  Parameters are in place on the patient's metoprolol.  Should continue with Entresto therapy at half a tablet per home dosing.  Euvolemic on examination and chest pain-free.  Will sign off.  Patient to follow-up with her cardiologist Dr. Snider in the outpatient setting per current outpatient schedule.      I spent 60 minutes in the professional and overall care of this patient.      SURAJ Cisneros-CNP

## 2024-11-18 NOTE — PROGRESS NOTES
Pharmacy Medication History Review    Caroline Robles is a 64 y.o. female admitted for TIA (transient ischemic attack). Pharmacy reviewed the patient's [id number] medications and allergies for accuracy.    Medications ADDED:  Saline nasal spray  Medications CHANGED:  none  Medications REMOVED:   None      The list below reflects the updated PTA list. Comments regarding how patient may be taking medications differently can be found in the Admit Orders Activity  Prior to Admission Medications   Prescriptions Last Dose Informant   Eliquis 5 mg tablet Unknown Self   Sig: Take 1 tablet (5 mg) by mouth 2 times a day.   PARoxetine (Paxil) 20 mg tablet Unknown Self   Sig: Take 1 tablet (20 mg) by mouth once daily in the morning.   cyanocobalamin (Vitamin B-12) 1,000 mcg tablet Unknown Self   Sig: Take 1 tablet (1,000 mcg) by mouth once daily.   esomeprazole (NexIUM) 20 mg DR capsule Unknown Self   Sig: Take 1 capsule (20 mg) by mouth once daily in the morning. Take before meals.   metoprolol succinate XL (Toprol-XL) 50 mg 24 hr tablet Unknown Self   Sig: Take 1 tablet (50 mg) by mouth once daily.   rosuvastatin (Crestor) 5 mg tablet Unknown Self   Sig: Take 1 tablet (5 mg) by mouth once daily.   sacubitriL-valsartan (Entresto) 24-26 mg tablet Unknown Self   Sig: Take 0.5 tablets by mouth 2 times a day.   sodium chloride (Ocean) 0.65 % nasal spray Unknown Self   Sig: Administer 1 spray into each nostril if needed for congestion.   valACYclovir (Valtrex) 500 mg tablet Unknown Self   Sig: Take 1 tablet (500 mg) by mouth once daily.      Facility-Administered Medications: None        The list below reflects the updated allergy list. Please review each documented allergy for additional clarification and justification.  Allergies  Reviewed by Hina Bajwa CPhT on 11/18/2024   No Known Allergies         Pharmacy has been updated to [facility] mail order.    Sources used to complete the med history include dispense  history, PTA medication list, patient interview. Patient is a good historian.    Below are additional concerns with the patient's PTA list.  Patient has been in ED since Saturday. I did not update home doses due to the duration patient has been here. There were no major changes to PTA medication list of concern.    Hina Bajwa, CPhT-Adv  Please reach out via [employer] Secure Chat for questions

## 2024-11-21 ENCOUNTER — PATIENT OUTREACH (OUTPATIENT)
Dept: CARE COORDINATION | Facility: CLINIC | Age: 64
End: 2024-11-21
Payer: COMMERCIAL

## 2024-11-27 ENCOUNTER — PATIENT OUTREACH (OUTPATIENT)
Dept: CARE COORDINATION | Facility: CLINIC | Age: 64
End: 2024-11-27
Payer: COMMERCIAL

## 2024-12-12 ENCOUNTER — APPOINTMENT (OUTPATIENT)
Dept: PRIMARY CARE | Facility: CLINIC | Age: 64
End: 2024-12-12
Payer: COMMERCIAL

## 2024-12-13 ENCOUNTER — APPOINTMENT (OUTPATIENT)
Dept: PRIMARY CARE | Facility: CLINIC | Age: 64
End: 2024-12-13
Payer: COMMERCIAL

## 2024-12-18 ENCOUNTER — PATIENT OUTREACH (OUTPATIENT)
Dept: CARE COORDINATION | Facility: CLINIC | Age: 64
End: 2024-12-18
Payer: COMMERCIAL

## 2025-01-03 ENCOUNTER — PATIENT OUTREACH (OUTPATIENT)
Dept: CARE COORDINATION | Facility: CLINIC | Age: 65
End: 2025-01-03
Payer: COMMERCIAL

## 2025-01-04 ENCOUNTER — LAB (OUTPATIENT)
Dept: LAB | Facility: LAB | Age: 65
End: 2025-01-04
Payer: COMMERCIAL

## 2025-01-04 DIAGNOSIS — I50.20 ACC/AHA STAGE C SYSTOLIC HEART FAILURE: ICD-10-CM

## 2025-01-04 LAB
ALBUMIN SERPL BCP-MCNC: 4.1 G/DL (ref 3.4–5)
ANION GAP SERPL CALCULATED.3IONS-SCNC: 10 MMOL/L (ref 10–20)
BNP SERPL-MCNC: 109 PG/ML (ref 0–99)
BUN SERPL-MCNC: 16 MG/DL (ref 6–23)
CALCIUM SERPL-MCNC: 8.5 MG/DL (ref 8.6–10.3)
CHLORIDE SERPL-SCNC: 108 MMOL/L (ref 98–107)
CO2 SERPL-SCNC: 27 MMOL/L (ref 21–32)
CREAT SERPL-MCNC: 0.78 MG/DL (ref 0.5–1.05)
EGFRCR SERPLBLD CKD-EPI 2021: 84 ML/MIN/1.73M*2
GLUCOSE SERPL-MCNC: 92 MG/DL (ref 74–99)
PHOSPHATE SERPL-MCNC: 3.4 MG/DL (ref 2.5–4.9)
POTASSIUM SERPL-SCNC: 4 MMOL/L (ref 3.5–5.3)
SODIUM SERPL-SCNC: 141 MMOL/L (ref 136–145)
TSH SERPL-ACNC: 1.59 MIU/L (ref 0.44–3.98)

## 2025-01-04 PROCEDURE — 84443 ASSAY THYROID STIM HORMONE: CPT

## 2025-01-04 PROCEDURE — 83880 ASSAY OF NATRIURETIC PEPTIDE: CPT

## 2025-01-04 PROCEDURE — 80069 RENAL FUNCTION PANEL: CPT

## 2025-01-09 ENCOUNTER — OFFICE VISIT (OUTPATIENT)
Dept: CARDIOLOGY | Facility: HOSPITAL | Age: 65
End: 2025-01-09
Payer: COMMERCIAL

## 2025-01-09 VITALS
OXYGEN SATURATION: 96 % | HEIGHT: 63 IN | WEIGHT: 162.2 LBS | BODY MASS INDEX: 28.74 KG/M2 | HEART RATE: 89 BPM | SYSTOLIC BLOOD PRESSURE: 105 MMHG | DIASTOLIC BLOOD PRESSURE: 71 MMHG

## 2025-01-09 DIAGNOSIS — I50.20 ACC/AHA STAGE C SYSTOLIC HEART FAILURE: Primary | ICD-10-CM

## 2025-01-09 DIAGNOSIS — I42.8 NONISCHEMIC CARDIOMYOPATHY (MULTI): ICD-10-CM

## 2025-01-09 PROCEDURE — 1036F TOBACCO NON-USER: CPT | Performed by: INTERNAL MEDICINE

## 2025-01-09 PROCEDURE — 99214 OFFICE O/P EST MOD 30 MIN: CPT | Performed by: INTERNAL MEDICINE

## 2025-01-09 PROCEDURE — 3078F DIAST BP <80 MM HG: CPT | Performed by: INTERNAL MEDICINE

## 2025-01-09 PROCEDURE — 3074F SYST BP LT 130 MM HG: CPT | Performed by: INTERNAL MEDICINE

## 2025-01-09 PROCEDURE — 1159F MED LIST DOCD IN RCRD: CPT | Performed by: INTERNAL MEDICINE

## 2025-01-09 PROCEDURE — 1126F AMNT PAIN NOTED NONE PRSNT: CPT | Performed by: INTERNAL MEDICINE

## 2025-01-09 PROCEDURE — 3008F BODY MASS INDEX DOCD: CPT | Performed by: INTERNAL MEDICINE

## 2025-01-09 PROCEDURE — 1160F RVW MEDS BY RX/DR IN RCRD: CPT | Performed by: INTERNAL MEDICINE

## 2025-01-09 RX ORDER — SPIRONOLACTONE 25 MG/1
12.5 TABLET ORAL DAILY
Qty: 15 TABLET | Refills: 11 | Status: SHIPPED | OUTPATIENT
Start: 2025-01-09 | End: 2026-01-09

## 2025-01-09 RX ORDER — SACUBITRIL AND VALSARTAN 24; 26 MG/1; MG/1
1 TABLET, FILM COATED ORAL 2 TIMES DAILY
Qty: 60 TABLET | Refills: 11 | Status: SHIPPED | OUTPATIENT
Start: 2025-01-09 | End: 2026-01-09

## 2025-01-09 RX ORDER — METOPROLOL SUCCINATE 50 MG/1
25 TABLET, EXTENDED RELEASE ORAL DAILY
Qty: 15 TABLET | Refills: 11 | Status: SHIPPED | OUTPATIENT
Start: 2025-01-09 | End: 2026-01-09

## 2025-01-09 RX ORDER — DAPAGLIFLOZIN 5 MG/1
5 TABLET, FILM COATED ORAL DAILY
Qty: 30 TABLET | Refills: 11 | Status: SHIPPED | OUTPATIENT
Start: 2025-01-09 | End: 2026-01-09

## 2025-01-09 ASSESSMENT — PATIENT HEALTH QUESTIONNAIRE - PHQ9
1. LITTLE INTEREST OR PLEASURE IN DOING THINGS: NOT AT ALL
SUM OF ALL RESPONSES TO PHQ9 QUESTIONS 1 AND 2: 0
2. FEELING DOWN, DEPRESSED OR HOPELESS: NOT AT ALL

## 2025-01-09 ASSESSMENT — ENCOUNTER SYMPTOMS
LOSS OF SENSATION IN FEET: 0
DEPRESSION: 0
OCCASIONAL FEELINGS OF UNSTEADINESS: 0

## 2025-01-09 ASSESSMENT — PAIN SCALES - GENERAL: PAINLEVEL_OUTOF10: 0-NO PAIN

## 2025-01-09 ASSESSMENT — COLUMBIA-SUICIDE SEVERITY RATING SCALE - C-SSRS
2. HAVE YOU ACTUALLY HAD ANY THOUGHTS OF KILLING YOURSELF?: NO
1. IN THE PAST MONTH, HAVE YOU WISHED YOU WERE DEAD OR WISHED YOU COULD GO TO SLEEP AND NOT WAKE UP?: NO
6. HAVE YOU EVER DONE ANYTHING, STARTED TO DO ANYTHING, OR PREPARED TO DO ANYTHING TO END YOUR LIFE?: NO

## 2025-01-09 NOTE — PATIENT INSTRUCTIONS
It was a pleasure seeing you today. Please contact myself or my team with any questions.     To reach Dr. Snider' office please call [phone] ([place]an).   Fax: [phone]   To schedule an appointment call [phone]     If you have any questions or need cardiac medication refills, please call the Heart Failure office at [phone], option [phone]. You may also contact the  Heart Failure Nursing team via email at [email or web address] (Please include your name and date of birth).        1) Decrease metoprolol to 25 mg once a day (1/2 pill once a day)  2) Increase entresto to 24/26 mg twice a day (full pill twice a day)  3) Start farxiga 5 mg once a day  4) Start spironolactone 12.5 mg once a day  5) Labs (RPF/BNP) in 7-10 days  6) Monitor your blood pressure and heart rate and call/email/epic when you do your labs  7) Follow up in 3 months at /[facility]

## 2025-01-09 NOTE — PROGRESS NOTES
[facility]  Primary Care Physician: Juli Teixeira MD  Referring Provider/Cardiologist: Bill ([facility])    Date of Visit: 2025  4:00 PM EST  Location of visit: [facility]     HPI:   Ms. Robles is a 64F with a PMHx sig for AVNRT s/p RFA (), SSS, h/o PVCs, stage C systolic HF/NICM/HFrEF currently without an ICD, aflutter on DOAC therapy, dyslipidemia, and GERD who returns to the Advanced Heart Failure clinic for ongoing evaluation and management of her cardiomyopathy.     Interval Hx:   She underwent a cardiac cath at [facility] which showed no CAD.     At the current time she reports exertional dyspnea, abdominal bloating, and LE edema. She denies chest pain, pressure, or palpitations.     Hospitalizations: Admitted in November for TIA   Medication adherence: Patient states yes       PMHx:  AVNRT s/p RFA (), SSS, h/o PVCs, stage C systolic HF/NICM/HFrEF currently without an ICD, aflutter on DOAC therapy, dyslipidemia, GERD     SocHx:  Lives alone in [place]  Denies etoh/tobacco/illicits  Office job in accounting    FamHx:  Mother with AF  Brothers x2  of cardiac issues         Current Outpatient Medications   Medication Sig Dispense Refill    cyanocobalamin (Vitamin B-12) 1,000 mcg tablet Take 1 tablet (1,000 mcg) by mouth once daily.      Eliquis 5 mg tablet Take 1 tablet (5 mg) by mouth 2 times a day.      esomeprazole (NexIUM) 20 mg DR capsule Take 1 capsule (20 mg) by mouth once daily in the morning. Take before meals.      metoprolol succinate XL (Toprol-XL) 50 mg 24 hr tablet Take 1 tablet (50 mg) by mouth once daily.      PARoxetine (Paxil) 20 mg tablet Take 1 tablet (20 mg) by mouth once daily in the morning.      rosuvastatin (Crestor) 5 mg tablet Take 1 tablet (5 mg) by mouth once daily.      sacubitriL-valsartan (Entresto) 24-26 mg tablet Take 0.5 tablets by mouth 2 times a day.      sodium chloride (Ocean) 0.65 % nasal spray Administer 1  "spray into each nostril if needed for congestion.      valACYclovir (Valtrex) 500 mg tablet Take 1 tablet (500 mg) by mouth once daily. 90 tablet 3     No current facility-administered medications for this visit.       No Known Allergies      Visit Vitals  /71 (BP Location: Right arm, Patient Position: Sitting)   Pulse 89   Ht 1.6 m (5' 3\")   Wt 73.6 kg (162 lb 3.2 oz)   SpO2 96%   BMI 28.73 kg/m²   OB Status Postmenopausal   Smoking Status Never   BSA 1.81 m²        Physical Exam:  On exam Ms. Robles appears her stated age, is alert and oriented x3, and in no acute distress. Her sclera are anicteric and her oropharynx has moist mucous membranes. Her neck is supple and without thyromegaly. The JVP is ~8 cm of water above the right atrium. Her cardiac exam has regular rhythm, normal S1, S2. No S3/4. There are no murmurs. Her lungs are clear to auscultation bilaterally and there is no dullness to percussion. Her abdomen is soft, nontender with normoactive bowel sounds. There is no HJR. The extremities are warm and without edema. The skin is dry. There is no rash present. The distal pulses are 2+ in all four extremities. Her mood and affect are appropriate for todays encounter.       Cardiac Labs/Diagnostics:    Lab Results   Component Value Date    CREATININE 0.78 01/04/2025    BUN 16 01/04/2025     01/04/2025    K 4.0 01/04/2025     (H) 01/04/2025    CO2 27 01/04/2025        Recent Labs     09/20/24  1326 06/29/23  0540 11/12/20  1000 10/26/18  0942 04/20/18  1043   CHOL 261* 163 238* 225* 275*   LDLF  --   --  157* 138* 170*   LDLCALC 169* 94  --   --   --    HDL 49.2 41* 52.7 52.1 47.7   TRIG 213* 140 142 174* 285*       ECG (9/26/24):  Sinus rhythm (HR 82), iRBBB    24hr holter (9/10/24):   9% PVCs and PACs, no sustained arrhythmias    Echo (9/9/24):  LVEF 30-35% (LVIDD 5.4 cm)  Normal RV size/function  Mild AI  Mild to mod MR  Mild TR      Impression/Plan:  Ms. Robles is a 64F with a PMHx sig " for AVNRT s/p RFA (2021), SSS, h/o PVCs, stage C systolic HF/NICM/HFrEF currently without an ICD, aflutter on DOAC therapy, dyslipidemia, and GERD who returns to the Advanced Heart Failure clinic for ongoing evaluation and management of her cardiomyopathy. At the current time she has functional class II symptoms and appears relatively euvolemic on exam.     1) Stage C chronic systolic HF/NICM/HFrEF with moderate to severe LV dysfunction (LVEF 30-35%; 9/2024) currently without an ICD  Referred for evaluation of her cardiomyopathy. H/o HFrEF which improved after her ablation, now with recurrence. Recent monitor without significant sustained arrhythmias. She underwent a cardiac catheterization which had no CAD. Will continue to focus on optimizing her GDMT. Given her symptoms concerning for congestion and fatigue, will decrease BB and optimize other GDMT.    -decrease metoprolol succinate to 25 mg daily  -increase entresto to 24/26 mg bid  -start farxiga 5 mg daily  -start spironolactone 12.5 mg daily  -monitor vitals and report in 1-2 weeks  -labs in 7-10 days (RFP/BNP)  -once on OMT for at least 3 months, will consider a cMRI to evaluate further given her fluctuating LVEF      F/U: 3 months at /Murtaza Khan,  Thank you for referring Ms. Robles to the  Advanced Heart Failure Clinic. Please let me know if you have any questions.     ____________________________________________________________  Dominic Snider DO  Section of Advanced Heart Failure and Cardiac Transplantation  Division of Cardiovascular Medicine  [facility]  [facility]   no

## 2025-01-13 ENCOUNTER — APPOINTMENT (OUTPATIENT)
Dept: PRIMARY CARE | Facility: CLINIC | Age: 65
End: 2025-01-13
Payer: COMMERCIAL

## 2025-01-13 VITALS
SYSTOLIC BLOOD PRESSURE: 116 MMHG | DIASTOLIC BLOOD PRESSURE: 64 MMHG | HEIGHT: 63 IN | BODY MASS INDEX: 29.06 KG/M2 | WEIGHT: 164 LBS

## 2025-01-30 ENCOUNTER — HOSPITAL ENCOUNTER (OUTPATIENT)
Dept: RADIOLOGY | Facility: CLINIC | Age: 65
Discharge: HOME | End: 2025-01-30
Payer: COMMERCIAL

## 2025-01-30 ENCOUNTER — EVALUATION (OUTPATIENT)
Facility: CLINIC | Age: 65
End: 2025-01-30
Payer: COMMERCIAL

## 2025-01-30 ENCOUNTER — OFFICE VISIT (OUTPATIENT)
Dept: ORTHOPEDIC SURGERY | Facility: CLINIC | Age: 65
End: 2025-01-30
Payer: COMMERCIAL

## 2025-01-30 DIAGNOSIS — M20.011 MALLET DEFORMITY OF RIGHT MIDDLE FINGER: Primary | ICD-10-CM

## 2025-01-30 DIAGNOSIS — M79.644 FINGER PAIN, RIGHT: ICD-10-CM

## 2025-01-30 DIAGNOSIS — M20.031 SWAN-NECK DEFORMITY OF FINGER OF RIGHT HAND: ICD-10-CM

## 2025-01-30 DIAGNOSIS — M20.011 MALLET DEFORMITY OF RIGHT MIDDLE FINGER: ICD-10-CM

## 2025-01-30 PROCEDURE — 99204 OFFICE O/P NEW MOD 45 MIN: CPT | Performed by: ORTHOPAEDIC SURGERY

## 2025-01-30 PROCEDURE — 1036F TOBACCO NON-USER: CPT | Performed by: ORTHOPAEDIC SURGERY

## 2025-01-30 PROCEDURE — L3933 FO W/O JOINTS CF: HCPCS

## 2025-01-30 PROCEDURE — 1160F RVW MEDS BY RX/DR IN RCRD: CPT | Performed by: ORTHOPAEDIC SURGERY

## 2025-01-30 PROCEDURE — 73140 X-RAY EXAM OF FINGER(S): CPT | Mod: RT

## 2025-01-30 PROCEDURE — 1159F MED LIST DOCD IN RCRD: CPT | Performed by: ORTHOPAEDIC SURGERY

## 2025-01-30 PROCEDURE — 99214 OFFICE O/P EST MOD 30 MIN: CPT | Performed by: ORTHOPAEDIC SURGERY

## 2025-01-30 ASSESSMENT — PAIN DESCRIPTION - DESCRIPTORS: DESCRIPTORS: ACHING;DISCOMFORT;TIGHTNESS

## 2025-01-30 ASSESSMENT — ENCOUNTER SYMPTOMS
DEPRESSION: 0
OCCASIONAL FEELINGS OF UNSTEADINESS: 0
LOSS OF SENSATION IN FEET: 0

## 2025-01-30 ASSESSMENT — PAIN - FUNCTIONAL ASSESSMENT: PAIN_FUNCTIONAL_ASSESSMENT: 0-10

## 2025-01-30 NOTE — PROGRESS NOTES
Occupational Therapy  Occupational Therapy Orthosis Evaluation    Patient Name: Caroline Robles  MRN: [id number]  Today's Date: 1/30/2025  Time Calculation  Start Time: 1025  Stop Time: 1100  Time Calculation (min): 35 min  FO x 1 R long finger   Insurance:  Visit number: 1  Authorization info:   Insurance Type: [employer]    General:  Reason for visit: Mallet finger and swan-neck deformity of R long finger   Referred by: Dr. 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 Yes  Work Related Injury? No    Hand Dominance: Right    PAIN     Location: R long finger DIPJ  Description: slightly tender  1-2/10    Patients Living Environment: Reviewed and no concern    Primary Language: English    Red Flags: Do you have any of the following? No  Fever/chills, unexplained weight changes, dizziness/fainting, unexplained change in bowel or bladder functions, unexplained malaise or muscle weakness, night pain/sweats, numbness or tingling    Objective:    Areas Impacted: ADL, IADL, and School/Work, typing     Performance Impacted: ROM and Pain      Physical Observation: Hyperextension of PIPJ and approximately 20 degree extension lag at DIPJ  Edema: Minimal dorsum of DIPJ   Wound: none     Sensory: Intact to light touch   Location:       ROM: Pt has full MP and PIPJ flexion. Unable to actively extend DIPJ. 10 degrees of hyperextension at PIPJ      Outcome Measures:  UEFI: 68/80    EDUCATION: splint wearing schedule, proper don/doff of orthosis, care/precautions/risks associated with orthosis/injury, home exercise program, plan of care, activity  modifications, pain management         Treatments:   Orthosis     35 min  Custom Fabricated  Orthosis Type: DIPJ extension orthosis and dorsal PIPJ extension blocking orthosis  Body part: R long finger   Orthosis Purpose: maintain DIPJ in extension and prevent PIPJ from hyperextension  Wearing Schedule: at all times and Pt may remove for hygiene        Other Treatment:     min        Assessment: Patient is a 63 yo RHD female presenting with a R long mallet finger and swan-neck deformity referred for orthosis fabrication.  Reviewed wear/care, proper use and don/doff procedures with patient along with relevant exercises per protocol.      Clinical Presentation: Evolving with changing characteristics  Personal Factors Impacting Care: None    Plan:   Goals:  1. Pt will be independent with don/doffing finger orthosis and verbalize understanding of mallet finger protocol.        Planned Interventions include: therapeutic exercise, therapeutic activity, self-care home management, manual therapy, therapeutic activity, kinesiotaping, orthosis fabrication  Follow up: goals achieved no further OT at this time, follow up PRN for orthosis adjustment or at direction of physican  Frequency: prn for orthosis adjustment   Duration:     Plan of care was developed with input and agreement by the patient/parent/guardian/caregiver    Ambulatory Screening Summary:      Screening  Frequency  Date Last Completed   Spiritual and Cultural Beliefs   Screening  each visit or episode of care 1/9/2025   Falls Risk Screening  every ambulatory visit    Pain Screening  annually at primary care visit  1/9/2025   Domestic Violence screening  annually at primary care visit 1/9/2025   Elder Abuse Screening  annually at primary care visit    Depression Screening  annually in the primary care setting 1/9/2025   Suicide Risk Screening  annually in the primary care setting 1/9/2025   Nutrition and Food Insecurity   Screening  at least annually at primary  care visit  11/16/2024   Key Learner  annually in the primary care setting 1/9/2025   Drug Screen  1/30/2025  9:31 AM   Alcohol Screen  1/30/2025  9:31 AM   Advance Directive           Ming Steven, OT, CHT

## 2025-01-31 NOTE — PROGRESS NOTES
History of Present Illness:  Chief Complaint   Patient presents with    Right Hand - Pain     Middle Finger Injury      64-year-old female presents for evaluation of right middle finger injury that initially occurred on 2024.  She jammed her middle finger and subsequently noted inability to fully extend at the DIP joint.  She has noticed continued inability to extend at the DIP joint as well as some additional hyperextension of her middle finger middle joint.  No numbness or tingling.  No previous issues with this digit.    Past Medical History:   Diagnosis Date    Breast cancer (Multi)     Breast neoplasm, Tis (DCIS), left 2017    Disorder of bone, unspecified 2018    Disorder of bone and articular cartilage    Disorder of bone, unspecified 2017    Disorder of bone and articular cartilage    History of uterine scar from previous surgery     History of  section    Other conditions influencing health status     History of pregnancy    Personal history of irradiation 2017    Personal history of other diseases of the circulatory system 10/28/2014    History of hypertension    Personal history of other diseases of the digestive system 10/28/2014    History of gastroesophageal reflux (GERD)    Personal history of other diseases of the respiratory system 2017    History of sinusitis    Personal history of other malignant neoplasm of skin     History of malignant neoplasm of skin       Medication Documentation Review Audit       Reviewed by Natasha Perkins LPN (Licensed Nurse) on 25 at 0930      Medication Order Taking? Sig Documenting Provider Last Dose Status   cyanocobalamin (Vitamin B-12) 1,000 mcg tablet [id number] Yes Take 1 tablet (1,000 mcg) by mouth once daily. Historical Provider, MD Unknown Active   dapagliflozin propanediol (Farxiga) 5 mg [id number] Yes Take 1 tablet (5 mg) by mouth once daily. Dominic Snider, DO  Active   Eliquis 5 mg tablet [id number]  Yes Take 1 tablet (5 mg) by mouth 2 times a day. Historical Provider, MD Unknown Active   esomeprazole (NexIUM) 20 mg DR capsule [id number] Yes Take 1 capsule (20 mg) by mouth once daily in the morning. Take before meals. Historical Provider, MD Unknown Active   metoprolol succinate XL (Toprol-XL) 50 mg 24 hr tablet [id number] Yes Take 0.5 tablets (25 mg) by mouth once daily. Dominic Snider, DO  Active   PARoxetine (Paxil) 20 mg tablet [id number] Yes Take 1 tablet (20 mg) by mouth once daily in the morning. Historical Provider, MD Unknown Active   rosuvastatin (Crestor) 5 mg tablet [id number] Yes Take 1 tablet (5 mg) by mouth once daily. Historical Provider, MD Unknown Active   sacubitriL-valsartan (Entresto) 24-26 mg tablet [id number] Yes Take 1 tablet by mouth 2 times a day. Dominic Snider, DO  Active   spironolactone (Aldactone) 25 mg tablet [id number] Yes Take 0.5 tablets (12.5 mg) by mouth once daily. Dominic Snider, DO  Active   valACYclovir (Valtrex) 500 mg tablet [id number] Yes Take 1 tablet (500 mg) by mouth once daily. Juli Teixeira MD Unknown Active                    No Known Allergies    Social History     Socioeconomic History    Marital status: Single     Spouse name: Not on file    Number of children: Not on file    Years of education: Not on file    Highest education level: Not on file   Occupational History    Not on file   Tobacco Use    Smoking status: Never    Smokeless tobacco: Never   Substance and Sexual Activity    Alcohol use: Never    Drug use: Never    Sexual activity: Not on file   Other Topics Concern    Not on file   Social History Narrative    Not on file     Social Drivers of Health     Financial Resource Strain: Low Risk  (11/16/2024)    Overall Financial Resource Strain (CARDIA)     Difficulty of Paying Living Expenses: Not hard at all   Food Insecurity: No Food Insecurity (11/16/2024)    Hunger Vital Sign     Worried About Running Out of Food in the Last Year: Never true      Ran Out of Food in the Last Year: Never true   Transportation Needs: No Transportation Needs (2024)    PRAPARE - Transportation     Lack of Transportation (Medical): No     Lack of Transportation (Non-Medical): No   Physical Activity: Inactive (2024)    Exercise Vital Sign     Days of Exercise per Week: 0 days     Minutes of Exercise per Session: 0 min   Stress: No Stress Concern Present (2024)    [place] [employer] - Occupational Stress Questionnaire     Feeling of Stress : Only a little   Social Connections: Socially Isolated (2024)    Social Connection and Isolation Panel [NHANES]     Frequency of Communication with Friends and Family: More than three times a week     Frequency of Social Gatherings with Friends and Family: Twice a week     Attends Congregational Services: Never     Active Member of Clubs or Organizations: No     Attends Club or Organization Meetings: Never     Marital Status:    Intimate Partner Violence: Not At Risk (2024)    Humiliation, Afraid, Rape, and Kick questionnaire     Fear of Current or Ex-Partner: No     Emotionally Abused: No     Physically Abused: No     Sexually Abused: No   Housing Stability: Low Risk  (2024)    Housing Stability Vital Sign     Unable to Pay for Housing in the Last Year: No     Number of Times Moved in the Last Year: 0     Homeless in the Last Year: No       Past Surgical History:   Procedure Laterality Date    ABLATION OF DYSRHYTHMIC FOCUS  2021    BI MAMMO GUIDED BREAST LEFT LOCALIZATION Left 2017    BI MAMMO GUIDED LOCALIZATION BREAST LEFT 8/3/2017 [facility]    BREAST BIOPSY  2017    BREAST LUMPECTOMY Left     LT lumpectomy w/radiation     SECTION, CLASSIC  2017     Section    OTHER SURGICAL HISTORY  2019    Catheter ablation    OTHER SURGICAL HISTORY  2017    Mohs Micrographic Surgery Face Left Side    TONSILLECTOMY  2017     Tonsillectomy        Review of Systems   GENERAL: Negative for malaise, significant weight loss, fever  MUSCULOSKELETAL: see HPI  NEURO:  Negative     Physical Examination  Constitutional: Appears well-developed and well-nourished.  Head: Normocephalic and atraumatic.  Eyes: EOMI grossly  Cardiovascular: Intact distal pulses.   Respiratory: Effort normal. No respiratory distress.  Neurologic: Alert and oriented to person, place, and time.  Skin: Skin is warm and dry.  Hematologic / Lymphatic: No lymphedema, lymphangitis.  Psychiatric: normal mood and affect. Behavior is normal.   Musculoskeletal:  Right middle finger: Girard-neck deformity with approximately 20 degrees PIP hyperextension and 30 degrees extensor lag at DIP joint.  Sensation grossly intact distally.  Able to passively correct extensor lag.  0 cm DPC.  Capillary refill less than 2 seconds.    Radiographs: Right middle finger radiographs ordered and available for my review/interpretation: No fracture/dislocation.  Mild degenerative changes.     Assessment:  Patient with right middle finger mallet finger and early swan-neck deformity     Plan:  Nature of the diagnosis was discussed with the patient.  We discussed recommendation for evaluation with occupational therapy and beginning full-time splinting.  Importance of compliance with splinting was stressed with patient as well as potential for long-term finger deformity.  Patient referred immediately to therapy for fabrication of splints.  She will follow-up with me in approximately 6 weeks for clinical check.  She has been instructed to follow-up with therapy if there are any issues with her splint.

## 2025-02-09 LAB
ALBUMIN SERPL-MCNC: 4.4 G/DL (ref 3.6–5.1)
BNP SERPL-MCNC: NORMAL PG/ML
BUN SERPL-MCNC: 16 MG/DL (ref 7–25)
BUN/CREAT SERPL: ABNORMAL (CALC) (ref 6–22)
CALCIUM SERPL-MCNC: 8.9 MG/DL (ref 8.6–10.4)
CHLORIDE SERPL-SCNC: 110 MMOL/L (ref 98–110)
CO2 SERPL-SCNC: 18 MMOL/L (ref 20–32)
CREAT SERPL-MCNC: 0.74 MG/DL (ref 0.5–1.05)
EGFRCR SERPLBLD CKD-EPI 2021: 90 ML/MIN/1.73M2
GLUCOSE SERPL-MCNC: 98 MG/DL (ref 65–99)
PHOSPHATE SERPL-MCNC: 3.7 MG/DL (ref 2.1–4.3)
POTASSIUM SERPL-SCNC: 3.8 MMOL/L (ref 3.5–5.3)
SODIUM SERPL-SCNC: 142 MMOL/L (ref 135–146)

## 2025-02-10 LAB
ALBUMIN SERPL-MCNC: 4.4 G/DL (ref 3.6–5.1)
BNP SERPL-MCNC: 35 PG/ML
BUN SERPL-MCNC: 16 MG/DL (ref 7–25)
BUN/CREAT SERPL: ABNORMAL (CALC) (ref 6–22)
CALCIUM SERPL-MCNC: 8.9 MG/DL (ref 8.6–10.4)
CHLORIDE SERPL-SCNC: 110 MMOL/L (ref 98–110)
CO2 SERPL-SCNC: 18 MMOL/L (ref 20–32)
CREAT SERPL-MCNC: 0.74 MG/DL (ref 0.5–1.05)
EGFRCR SERPLBLD CKD-EPI 2021: 90 ML/MIN/1.73M2
GLUCOSE SERPL-MCNC: 98 MG/DL (ref 65–99)
PHOSPHATE SERPL-MCNC: 3.7 MG/DL (ref 2.1–4.3)
POTASSIUM SERPL-SCNC: 3.8 MMOL/L (ref 3.5–5.3)
SODIUM SERPL-SCNC: 142 MMOL/L (ref 135–146)

## 2025-02-21 DIAGNOSIS — I49.5 SICK SINUS SYNDROME (MULTI): ICD-10-CM

## 2025-02-21 DIAGNOSIS — I42.0 DILATED CARDIOMYOPATHY (MULTI): ICD-10-CM

## 2025-02-21 DIAGNOSIS — Z98.890 OTHER SPECIFIED POSTPROCEDURAL STATES: ICD-10-CM

## 2025-02-21 DIAGNOSIS — I48.4 ATYPICAL ATRIAL FLUTTER (MULTI): ICD-10-CM

## 2025-02-21 DIAGNOSIS — I47.10 SUPRAVENTRICULAR TACHYCARDIA, UNSPECIFIED (CMS-HCC): ICD-10-CM

## 2025-03-06 ENCOUNTER — HOSPITAL ENCOUNTER (OUTPATIENT)
Dept: CARDIOLOGY | Facility: CLINIC | Age: 65
Discharge: HOME | End: 2025-03-06
Payer: COMMERCIAL

## 2025-03-06 DIAGNOSIS — I42.0 DILATED CARDIOMYOPATHY (MULTI): Primary | ICD-10-CM

## 2025-03-06 DIAGNOSIS — I49.5 SICK SINUS SYNDROME (MULTI): ICD-10-CM

## 2025-03-06 DIAGNOSIS — I48.4 ATYPICAL ATRIAL FLUTTER (MULTI): ICD-10-CM

## 2025-03-06 DIAGNOSIS — I42.0 DILATED CARDIOMYOPATHY (MULTI): ICD-10-CM

## 2025-03-06 DIAGNOSIS — Z98.890 OTHER SPECIFIED POSTPROCEDURAL STATES: ICD-10-CM

## 2025-03-06 DIAGNOSIS — I47.10 SUPRAVENTRICULAR TACHYCARDIA, UNSPECIFIED (CMS-HCC): ICD-10-CM

## 2025-03-06 PROCEDURE — 93308 TTE F-UP OR LMTD: CPT | Performed by: INTERNAL MEDICINE

## 2025-03-06 PROCEDURE — 2500000004 HC RX 250 GENERAL PHARMACY W/ HCPCS (ALT 636 FOR OP/ED): Performed by: STUDENT IN AN ORGANIZED HEALTH CARE EDUCATION/TRAINING PROGRAM

## 2025-03-06 PROCEDURE — 93325 DOPPLER ECHO COLOR FLOW MAPG: CPT | Performed by: INTERNAL MEDICINE

## 2025-03-06 PROCEDURE — 93325 DOPPLER ECHO COLOR FLOW MAPG: CPT

## 2025-03-06 PROCEDURE — 93321 DOPPLER ECHO F-UP/LMTD STD: CPT | Performed by: INTERNAL MEDICINE

## 2025-03-06 RX ADMIN — PERFLUTREN 5 ML OF DILUTION: 6.52 INJECTION, SUSPENSION INTRAVENOUS at 15:48

## 2025-03-07 LAB
AORTIC VALVE PEAK VELOCITY: 1.46 M/S
AV PEAK GRADIENT: 8 MMHG
AVA (PEAK VEL): 1.34 CM2
EJECTION FRACTION APICAL 4 CHAMBER: 52.4
EJECTION FRACTION: 58 %
LEFT ATRIUM VOLUME AREA LENGTH INDEX BSA: 21.7 ML/M2
LEFT VENTRICLE INTERNAL DIMENSION DIASTOLE: 5.43 CM (ref 3.5–6)
LEFT VENTRICULAR OUTFLOW TRACT DIAMETER: 1.79 CM
MITRAL VALVE E/A RATIO: 0.74
RIGHT VENTRICLE FREE WALL PEAK S': 9.76 CM/S
RIGHT VENTRICLE PEAK SYSTOLIC PRESSURE: 21.5 MMHG
TRICUSPID ANNULAR PLANE SYSTOLIC EXCURSION: 1.7 CM

## 2025-04-10 ENCOUNTER — APPOINTMENT (OUTPATIENT)
Dept: CARDIOLOGY | Facility: HOSPITAL | Age: 65
End: 2025-04-10
Payer: COMMERCIAL

## 2025-04-13 LAB
ANION GAP SERPL CALCULATED.4IONS-SCNC: 10 MMOL/L (CALC) (ref 7–17)
BUN SERPL-MCNC: 11 MG/DL (ref 7–25)
BUN/CREAT SERPL: ABNORMAL (CALC) (ref 6–22)
CALCIUM SERPL-MCNC: 8.6 MG/DL (ref 8.6–10.4)
CHLORIDE SERPL-SCNC: 111 MMOL/L (ref 98–110)
CO2 SERPL-SCNC: 21 MMOL/L (ref 20–32)
CREAT SERPL-MCNC: 0.81 MG/DL (ref 0.5–1.05)
EGFRCR SERPLBLD CKD-EPI 2021: 81 ML/MIN/1.73M2
GLUCOSE SERPL-MCNC: 95 MG/DL (ref 65–99)
POTASSIUM SERPL-SCNC: 4 MMOL/L (ref 3.5–5.3)
SODIUM SERPL-SCNC: 142 MMOL/L (ref 135–146)

## 2025-04-17 ENCOUNTER — HOSPITAL ENCOUNTER (OUTPATIENT)
Dept: RADIOLOGY | Facility: HOSPITAL | Age: 65
Discharge: HOME | End: 2025-04-17
Payer: COMMERCIAL

## 2025-04-17 VITALS — WEIGHT: 163.14 LBS | HEIGHT: 63 IN | BODY MASS INDEX: 28.91 KG/M2

## 2025-04-17 DIAGNOSIS — I42.0 DILATED CARDIOMYOPATHY (MULTI): ICD-10-CM

## 2025-04-17 DIAGNOSIS — I49.5 SICK SINUS SYNDROME (MULTI): ICD-10-CM

## 2025-04-17 DIAGNOSIS — I47.10 SUPRAVENTRICULAR TACHYCARDIA, UNSPECIFIED (CMS-HCC): ICD-10-CM

## 2025-04-17 DIAGNOSIS — Z98.890 OTHER SPECIFIED POSTPROCEDURAL STATES: ICD-10-CM

## 2025-04-17 DIAGNOSIS — I48.4 ATYPICAL ATRIAL FLUTTER: ICD-10-CM

## 2025-04-17 PROCEDURE — 75561 CARDIAC MRI FOR MORPH W/DYE: CPT

## 2025-04-17 PROCEDURE — 2550000001 HC RX 255 CONTRASTS: Mod: JZ

## 2025-04-17 PROCEDURE — A9575 INJ GADOTERATE MEGLUMI 0.1ML: HCPCS | Mod: JZ

## 2025-04-17 RX ORDER — GADOTERATE MEGLUMINE 376.9 MG/ML
30 INJECTION INTRAVENOUS
Status: COMPLETED | OUTPATIENT
Start: 2025-04-17 | End: 2025-04-17

## 2025-04-17 RX ADMIN — GADOTERATE MEGLUMINE 30 ML: 376.9 INJECTION INTRAVENOUS at 08:43

## 2025-04-24 ENCOUNTER — TREATMENT (OUTPATIENT)
Facility: CLINIC | Age: 65
End: 2025-04-24
Payer: COMMERCIAL

## 2025-04-24 ENCOUNTER — OFFICE VISIT (OUTPATIENT)
Dept: ORTHOPEDIC SURGERY | Facility: CLINIC | Age: 65
End: 2025-04-24
Payer: COMMERCIAL

## 2025-04-24 VITALS — BODY MASS INDEX: 30 KG/M2 | HEIGHT: 62 IN | WEIGHT: 163 LBS

## 2025-04-24 DIAGNOSIS — M20.011 MALLET DEFORMITY OF RIGHT MIDDLE FINGER: ICD-10-CM

## 2025-04-24 DIAGNOSIS — M20.011 MALLET DEFORMITY OF RIGHT MIDDLE FINGER: Primary | ICD-10-CM

## 2025-04-24 DIAGNOSIS — M20.031 SWAN-NECK DEFORMITY OF FINGER OF RIGHT HAND: ICD-10-CM

## 2025-04-24 PROCEDURE — 1036F TOBACCO NON-USER: CPT | Performed by: ORTHOPAEDIC SURGERY

## 2025-04-24 PROCEDURE — 1159F MED LIST DOCD IN RCRD: CPT | Performed by: ORTHOPAEDIC SURGERY

## 2025-04-24 PROCEDURE — 99213 OFFICE O/P EST LOW 20 MIN: CPT | Performed by: ORTHOPAEDIC SURGERY

## 2025-04-24 PROCEDURE — 1160F RVW MEDS BY RX/DR IN RCRD: CPT | Performed by: ORTHOPAEDIC SURGERY

## 2025-04-24 PROCEDURE — 97110 THERAPEUTIC EXERCISES: CPT | Mod: GO

## 2025-04-24 PROCEDURE — 3008F BODY MASS INDEX DOCD: CPT | Performed by: ORTHOPAEDIC SURGERY

## 2025-04-24 PROCEDURE — 97763 ORTHC/PROSTC MGMT SBSQ ENC: CPT | Mod: GO

## 2025-04-24 ASSESSMENT — PAIN - FUNCTIONAL ASSESSMENT: PAIN_FUNCTIONAL_ASSESSMENT: NO/DENIES PAIN

## 2025-04-24 NOTE — PROGRESS NOTES
History of Present Illness:  Chief Complaint   Patient presents with    Right Hand - Follow-up     right middle finger mallet finger and early swan-neck deformity     Patient has been undergoing nonoperative treatment for right long finger mallet with early swan-neck deformity.  She has been full-time splinting since her last appointment January 30, 2025.  No pain at this time.  Some occasional skin irritation from splinting.    Medical History[1]    Medication Documentation Review Audit       Reviewed by Jailyn Borrego MA (Medical Assistant) on 04/24/25 at 0909      Medication Order Taking? Sig Documenting Provider Last Dose Status   cyanocobalamin (Vitamin B-12) 1,000 mcg tablet [id number] No Take 1 tablet (1,000 mcg) by mouth once daily. Historical Provider, MD Unknown Active   dapagliflozin propanediol (Farxiga) 5 mg [id number]  Take 1 tablet (5 mg) by mouth once daily. Dominic Snider, DO  Active   Eliquis 5 mg tablet [id number] No Take 1 tablet (5 mg) by mouth 2 times a day. Historical Provider, MD Unknown Active   esomeprazole (NexIUM) 20 mg DR capsule [id number] No Take 1 capsule (20 mg) by mouth once daily in the morning. Take before meals. Historical Provider, MD Unknown Active   metoprolol succinate XL (Toprol-XL) 50 mg 24 hr tablet [id number]  Take 0.5 tablets (25 mg) by mouth once daily. Dominic Snider,   Active   PARoxetine (Paxil) 20 mg tablet [id number] No Take 1 tablet (20 mg) by mouth once daily in the morning. Historical Provider, MD Unknown Active   rosuvastatin (Crestor) 5 mg tablet [id number] No Take 1 tablet (5 mg) by mouth once daily. Historical Provider, MD Unknown Active   sacubitriL-valsartan (Entresto) 24-26 mg tablet [id number]  Take 1 tablet by mouth 2 times a day. Dominic Snider,   Active   spironolactone (Aldactone) 25 mg tablet [id number]  Take 0.5 tablets (12.5 mg) by mouth once daily. Dominic Snider, DO  Active   valACYclovir (Valtrex) 500 mg tablet [id number] No Take 1  tablet (500 mg) by mouth once daily. Juli Teixeira MD Unknown Active                    RX Allergies[2]    Social History     Socioeconomic History    Marital status: Single     Spouse name: Not on file    Number of children: Not on file    Years of education: Not on file    Highest education level: Not on file   Occupational History    Not on file   Tobacco Use    Smoking status: Never    Smokeless tobacco: Never   Substance and Sexual Activity    Alcohol use: Never    Drug use: Never    Sexual activity: Not on file   Other Topics Concern    Not on file   Social History Narrative    Not on file     Social Drivers of Health     Financial Resource Strain: Low Risk  (11/16/2024)    Overall Financial Resource Strain (CARDIA)     Difficulty of Paying Living Expenses: Not hard at all   Food Insecurity: No Food Insecurity (11/16/2024)    Hunger Vital Sign     Worried About Running Out of Food in the Last Year: Never true     Ran Out of Food in the Last Year: Never true   Transportation Needs: No Transportation Needs (11/16/2024)    PRAPARE - Transportation     Lack of Transportation (Medical): No     Lack of Transportation (Non-Medical): No   Physical Activity: Inactive (11/16/2024)    Exercise Vital Sign     Days of Exercise per Week: 0 days     Minutes of Exercise per Session: 0 min   Stress: No Stress Concern Present (11/16/2024)    [employer] - Occupational Stress Questionnaire     Feeling of Stress : Only a little   Social Connections: Socially Isolated (11/16/2024)    Social Connection and Isolation Panel [NHANES]     Frequency of Communication with Friends and Family: More than three times a week     Frequency of Social Gatherings with Friends and Family: Twice a week     Attends Yazidism Services: Never     Active Member of Clubs or Organizations: No     Attends Club or Organization Meetings: Never     Marital Status:    Intimate Partner Violence: Not At Risk (11/16/2024)     Humiliation, Afraid, Rape, and Kick questionnaire     Fear of Current or Ex-Partner: No     Emotionally Abused: No     Physically Abused: No     Sexually Abused: No   Housing Stability: Low Risk  (11/16/2024)    Housing Stability Vital Sign     Unable to Pay for Housing in the Last Year: No     Number of Times Moved in the Last Year: 0     Homeless in the Last Year: No       Surgical History[3]     Review of Systems   GENERAL: Negative for malaise, significant weight loss, fever  MUSCULOSKELETAL: see HPI  NEURO:  Negative     Physical Examination  Constitutional: Appears well-developed and well-nourished.  Head: Normocephalic and atraumatic.  Eyes: EOMI grossly  Cardiovascular: Intact distal pulses.   Respiratory: Effort normal. No respiratory distress.  Neurologic: Alert and oriented to person, place, and time.  Skin: Skin is warm and dry.  Hematologic / Lymphatic: No lymphedema, lymphangitis.  Psychiatric: normal mood and affect. Behavior is normal.   Musculoskeletal:  Right long finger: Approximately 5 degree PIP physiologic hyperextension that is similar to other digits.  No DIP extensor lag at this time when lifting finger straight from table.  Capillary refill less than 2 seconds distally.  Sensation intact.  Skin intact as well.    Assessment:  Patient with right long finger mallet undergoing nonoperative treatment with full-time splinting.     Plan:  Patient has splinted full-time since her last visit.  We discussed recommendation for gradual transitioning out of splint while still using splint overnight for the next 4 to 6 weeks.  She will follow-up immediately with therapy to further review splint weaning protocol.  She will follow-up with me if any persistent issues/concerns.                [1]   Past Medical History:  Diagnosis Date    Breast cancer     Breast neoplasm, Tis (DCIS), left 07/01/2017    Disorder of bone, unspecified 04/20/2018    Disorder of bone and articular cartilage    Disorder of bone,  unspecified 2017    Disorder of bone and articular cartilage    History of uterine scar from previous surgery     History of  section    Other conditions influencing health status     History of pregnancy    Personal history of irradiation 2017    Personal history of other diseases of the circulatory system 10/28/2014    History of hypertension    Personal history of other diseases of the digestive system 10/28/2014    History of gastroesophageal reflux (GERD)    Personal history of other diseases of the respiratory system 2017    History of sinusitis    Personal history of other malignant neoplasm of skin     History of malignant neoplasm of skin   [2] No Known Allergies  [3]   Past Surgical History:  Procedure Laterality Date    ABLATION OF DYSRHYTHMIC FOCUS  2021    BI MAMMO GUIDED BREAST LEFT LOCALIZATION Left 2017    BI MAMMO GUIDED LOCALIZATION BREAST LEFT 8/3/2017 [facility]    BREAST BIOPSY  2017    BREAST LUMPECTOMY Left     LT lumpectomy w/radiation     SECTION, CLASSIC  2017     Section    OTHER SURGICAL HISTORY  2019    Catheter ablation    OTHER SURGICAL HISTORY  2017    Mohs Micrographic Surgery Face Left Side    TONSILLECTOMY  2017    Tonsillectomy

## 2025-04-24 NOTE — PROGRESS NOTES
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 Presents with stiffness R MF PIP and DIP joints during flexion      Plan: Pt will wean out of full-time extension orthosis during the day and wear at night. Can begin AROM exercises as tolerated. Instructed to avoid passive flexion of fingertip        Subjective  \" My finger is a little stiff\"  General:        Pain Assessment:  3/10 R PIP and DIPJ with flexion        Objective    R MF PIP: 0/80           DIPJ: 0/15  Modalities:     Communication:     Splinting: Modified DIPJ extension orthosis and converted to volar based DIPJ extension to be worn at night for the next 2-3 weeks.      Therapeutic Exercise   Blocking for DIPJ flexion   Straight fist   Full fist   Manual Therapy       Therapy/Activity:   Strength:     Other Activity:     Outcome Measures:      OP EDUCATION: Reviewed weaning schedule from orthosis and AROM exercises to address stiffness       Goals:  1. Pt will achieve 250 degrees BORGES R MF to improve function for ADL's              "

## 2025-05-08 ENCOUNTER — OFFICE VISIT (OUTPATIENT)
Dept: CARDIOLOGY | Facility: HOSPITAL | Age: 65
End: 2025-05-08
Payer: COMMERCIAL

## 2025-05-08 VITALS
DIASTOLIC BLOOD PRESSURE: 72 MMHG | SYSTOLIC BLOOD PRESSURE: 108 MMHG | OXYGEN SATURATION: 98 % | HEART RATE: 89 BPM | WEIGHT: 160 LBS | HEIGHT: 63 IN | BODY MASS INDEX: 28.35 KG/M2

## 2025-05-08 DIAGNOSIS — I42.8 NONISCHEMIC CARDIOMYOPATHY (MULTI): ICD-10-CM

## 2025-05-08 DIAGNOSIS — I50.20 ACC/AHA STAGE C SYSTOLIC HEART FAILURE: Primary | ICD-10-CM

## 2025-05-08 PROCEDURE — 99214 OFFICE O/P EST MOD 30 MIN: CPT | Performed by: INTERNAL MEDICINE

## 2025-05-08 PROCEDURE — 3078F DIAST BP <80 MM HG: CPT | Performed by: INTERNAL MEDICINE

## 2025-05-08 PROCEDURE — 3074F SYST BP LT 130 MM HG: CPT | Performed by: INTERNAL MEDICINE

## 2025-05-08 PROCEDURE — 1036F TOBACCO NON-USER: CPT | Performed by: INTERNAL MEDICINE

## 2025-05-08 PROCEDURE — 3008F BODY MASS INDEX DOCD: CPT | Performed by: INTERNAL MEDICINE

## 2025-05-08 PROCEDURE — 1126F AMNT PAIN NOTED NONE PRSNT: CPT | Performed by: INTERNAL MEDICINE

## 2025-05-08 PROCEDURE — 1160F RVW MEDS BY RX/DR IN RCRD: CPT | Performed by: INTERNAL MEDICINE

## 2025-05-08 PROCEDURE — 1159F MED LIST DOCD IN RCRD: CPT | Performed by: INTERNAL MEDICINE

## 2025-05-08 ASSESSMENT — ENCOUNTER SYMPTOMS
OCCASIONAL FEELINGS OF UNSTEADINESS: 0
DEPRESSION: 0
LOSS OF SENSATION IN FEET: 0

## 2025-05-08 ASSESSMENT — PAIN SCALES - GENERAL: PAINLEVEL_OUTOF10: 0-NO PAIN

## 2025-05-08 NOTE — PATIENT INSTRUCTIONS
It was a pleasure seeing you today. Please contact myself or my team with any questions.     To reach Dr. Snider' office please call [phone] ([place]).   Fax: [phone]   To schedule an appointment call [phone]     If you have any questions or need cardiac medication refills, please call the Heart Failure office at [phone], option [phone]. You may also contact the  Heart Failure Nursing team via email at [email or web address] (Please include your name and date of birth).        1) Continue your current medications  2) We will refer you to cardiac rehab at [place]  3) Follow up in 6 months at /[facility]

## 2025-05-08 NOTE — PROGRESS NOTES
[facility] Advanced Heart Failure Clinic  Primary Care Physician: Juli Teixeira MD  Referring Provider/Cardiologist: Bill ([facility])    Date of Visit: 2025  3:40 PM EDT  Location of visit: [facility]     HPI:   Ms. Robles is a 64F with a PMHx sig for AVNRT s/p RFA (), SSS, h/o PVCs, stage C systolic HF/NICM/HFrEF currently without an ICD, aflutter on DOAC therapy, dyslipidemia, and GERD who returns to the Advanced Heart Failure clinic for ongoing evaluation and management of her cardiomyopathy.     Interval Hx:   She is tolerating her current GDMT. She reports some stable exertional dyspnea. Otherwise, she denies chest pain, pressure, PND, orthopnea, LE edema, palpitations, light headedness, dizziness, or syncope.     Hospitalizations: Denies       PMHx:  AVNRT s/p RFA (), SSS, h/o PVCs, stage C systolic HF/NICM/HFrEF currently without an ICD, aflutter on DOAC therapy, dyslipidemia, GERD     SocHx:  Lives alone in [place]  Denies etoh/tobacco/illicits  Office job in accounting    FamHx:  Mother with AF  Brothers x2  of cardiac issues      Current Outpatient Medications   Medication Sig Dispense Refill    cyanocobalamin (Vitamin B-12) 1,000 mcg tablet Take 1 tablet (1,000 mcg) by mouth once daily.      dapagliflozin propanediol (Farxiga) 5 mg Take 1 tablet (5 mg) by mouth once daily. 30 tablet 11    Eliquis 5 mg tablet Take 1 tablet (5 mg) by mouth 2 times a day.      esomeprazole (NexIUM) 20 mg DR capsule Take 1 capsule (20 mg) by mouth once daily in the morning. Take before meals.      metoprolol succinate XL (Toprol-XL) 50 mg 24 hr tablet Take 0.5 tablets (25 mg) by mouth once daily. 15 tablet 11    PARoxetine (Paxil) 20 mg tablet Take 1 tablet (20 mg) by mouth once daily in the morning.      rosuvastatin (Crestor) 5 mg tablet Take 1 tablet (5 mg) by mouth once daily.      sacubitriL-valsartan (Entresto) 24-26 mg tablet Take 1 tablet by mouth 2 times a day. 60 tablet 11  "   spironolactone (Aldactone) 25 mg tablet Take 0.5 tablets (12.5 mg) by mouth once daily. 15 tablet 11    valACYclovir (Valtrex) 500 mg tablet Take 1 tablet (500 mg) by mouth once daily. 90 tablet 3     No current facility-administered medications for this visit.       No Known Allergies      Visit Vitals  /72 (BP Location: Left arm, Patient Position: Sitting, BP Cuff Size: Adult)   Pulse 89   Ht 1.6 m (5' 3\")   Wt 72.6 kg (160 lb)   SpO2 98%   BMI 28.34 kg/m²   OB Status Postmenopausal   Smoking Status Never   BSA 1.8 m²        Physical Exam:  On exam Ms. Robles appears her stated age, is alert and oriented x3, and in no acute distress. Her sclera are anicteric and her oropharynx has moist mucous membranes. Her neck is supple and without thyromegaly. The JVP is ~6 cm of water above the right atrium. Her cardiac exam has regular rhythm, normal S1, S2. No S3/4. There are no murmurs. Her lungs are clear to auscultation bilaterally and there is no dullness to percussion. Her abdomen is soft, nontender with normoactive bowel sounds. There is no HJR. The extremities are warm and without edema. The skin is dry. There is no rash present. The distal pulses are 2+ in all four extremities. Her mood and affect are appropriate for todays encounter.      Cardiac Labs/Diagnostics:    Lab Results   Component Value Date    CREATININE 0.81 04/12/2025    BUN 11 04/12/2025     04/12/2025    K 4.0 04/12/2025     (H) 04/12/2025    CO2 21 04/12/2025        Recent Labs     09/20/24  1326 06/29/23  0540 11/12/20  1000 10/26/18  0942 04/20/18  1043   CHOL 261* 163 238* 225* 275*   LDLF  --   --  157* 138* 170*   LDLCALC 169* 94  --   --   --    HDL 49.2 41* 52.7 52.1 47.7   TRIG 213* 140 142 174* 285*       Recent Labs     02/08/25  1141 01/04/25  0937   BNP 35 109*       cMRI (4/17/25):  1. Normal LV size (EDVi 73 ml/m2) with mildly reduced systolic function (LVEF 40%).  2. Global hypokinesis with minor regional " variation.  3. Mild central aortic valve regurgitation (RF 13%).  4. Mild mitral regurgitation (RF 50%).  5. No evidence of myocardial inflammation/edema on T2 mapping.  6. Mild mid wall fibrosis of the septum on LGE imaging (nonspecific, nonischemic pattern). No evidence of infarction or infiltration.  7. Normal RV size (EDVi 64 ml/m2) with mildly reduced systolic function (RVEF 42%).  8. Moderate-Large hiatal hernia.    ECG (9/26/24):  Sinus rhythm (HR 82), iRBBB    24hr holter (9/10/24):   9% PVCs and PACs, no sustained arrhythmias    Echo (9/9/24):  LVEF 30-35% (LVIDD 5.4 cm)  Normal RV size/function  Mild AI  Mild to mod MR  Mild TR      Impression/Plan:  Ms. Robles is a 64F with a PMHx sig for AVNRT s/p RFA (2021), SSS, h/o PVCs, stage C systolic HF/NICM/HFrEF currently without an ICD, aflutter on DOAC therapy, dyslipidemia, and GERD who returns to the Advanced Heart Failure clinic for ongoing evaluation and management of her cardiomyopathy. At the current time she has functional class II symptoms and appears euvolemic on exam.     1) Stage C chronic systolic HF/NICM/HFrEF with moderate LV dysfunction (LVEF 40%; 4/2025) currently without an ICD  She has tolerated optimization of her GDMT with no further room to uptitrate. Her BNP has normalized. Recent cMRI with EF of 40%. Still with some exertional symptoms; will refer to cardiac rehab.   -metoprolol succinate 25 mg daily, entresto 24/26 mg bid, farxiga 5 mg daily, spironolactone 12.5 mg daily  -refer to cardiac rehab      F/U: 6 months at /[facility],  Thank you for referring Ms. Robles to the  Advanced Heart Failure Clinic. Please let me know if you have any questions.       ____________________________________________________________  Dominic Snider DO  Section of Advanced Heart Failure and Cardiac Transplantation  Division of Cardiovascular Medicine  [facility]  [facility]

## 2025-05-14 DIAGNOSIS — I50.20 ACC/AHA STAGE C SYSTOLIC HEART FAILURE: Primary | ICD-10-CM

## 2025-05-21 ENCOUNTER — CLINICAL SUPPORT (OUTPATIENT)
Dept: CARDIAC REHAB | Facility: CLINIC | Age: 65
End: 2025-05-21
Payer: COMMERCIAL

## 2025-05-21 DIAGNOSIS — I50.20 ACC/AHA STAGE C SYSTOLIC HEART FAILURE: ICD-10-CM

## 2025-05-21 NOTE — PROGRESS NOTES
Cardiac Rehabilitation Individual Treatment Plan  Initial Treatment Plan      Today's Date:   5/21/2025                       Program Location: [address], [place]    Name: Caroline Robles                            Medical Record Number: [id number]                          YOB: 1960    Referring Physician: Dominic Snider DO  [id number] [place] [address] [phone]  Primary Care Physician: Juli Teixeira MD    Referring Diagnosis / Date of Diagnosis  ACC/AHA STAGE C SYSTOLIC HEART FAILURE I50.20 1/9/25  N/A N/A      AACVPR Risk Stratification:  High    Learning Assessment:  Cardiac Knowledge Test:       Pre: PENDING/15                           Post:        /15  Learning assessment/barriers: None    Preferred learning method: Auditory, Visual, and Reading handout      Education Classes: Schedule given with education manual    Psychosocial Initial Assessment    Psychosocial Assessment  Pt reported/currently experiencing stress : Yes, Stress and Depression.   Current Stress Level (1-10 scale):  6-7/10  History of anxiety/depression: Yes, Stress, Depression, and WORK AND FAMILY STRESS.  Currently seeing a mental health provider: No   Psychosocial Meds: Yes - PAXIL  Medication changes: No    Social Support: Yes, Whom:CHILDREN, BUT LIMITED OVERALL SOCIAL SUPPORT     Psychosocial Test:  PHQ-9  PHQ-9 score:   Pre:  N/A  Mid:  N/A  Post:   N/A  Interpretation:    Needs to complete     Psychosocial Plan   Goal Status:   In progress   Psychosocial Goals:  Attend Art Therapy, Attend Chair Yoga, Develop positive support system, Improve stress level, Learn breathing techniques and stretches, Stay compliant with medications, Participate in music therapy cooldown   Psychosocial Interventions/Education:  Family support, Staff and patient discussion ENCOURAGE COMMUNITY BOOK CLUBS OR MEHTA PROGRAMS FOR SUPPORT AND HOBBY ENGAGEMENT      Nutrition Initial Assessment    Nutrition  "Assessment  Picture Your Plate Score:  Pre:   Post:  Picture Your Plate Interpretation:   did not complete , GIVEN AT EVAL  Special Diet:   NO SPECIAL DIET   Dietary Goal:  Mediterranean Diet;  PYP >60     Weight Management:   Weight:  160 lb   Height:  63.5 \"  BMI:  27.9  Weighing Daily: No    Goal Weight:   130    Heart Failure Assessment:   Most recent EF: 40%  Date: 4/17/25  Hx of Heart Failure: Yes    Heart Failure medications: Yes - Entresto, Farxiga, spironolactone    Heart Failure medication changes: No     Comments: has home scale, encouraged daily wts    Nutrition Plan   Goal Status: In progress  Nutrition Goals:   goal weight 130#, follow low fat/low sodium diet, develop heart healthy diet, Mediterranean diet , improve PYP score, daily weights    RD Recommendations:        Nutrition Interventions/Education: complete PYP diet survey, Encouraged dietician lecture attendance      Exercise Initial Assessment    Exercise Assessment    Fall Risk Results:  medium  Initial Intake: Starting MET Level 2.7   Current MET level 2.7    Discharge MET level n/a    6-Min Walk Test:     PRE:     Feet:    1485       METS:  3.1                 POST:  Feet:    n/a       METS:  n/a                    Current Home Exercise:   No   Mode: n/a  Frequency (days/week):   n/a  Duration(mins/day):    n/a    Exercise Plan  Exercise Prescription based on 6MWT and/or evaluation   Frequency:   2-3   days/week   Duration:   40  minutes    Intensity: RPE (0-10 Denise Scale):  2-4 (light to somewhat hard)  Target HR: rest+30bpm  Intensity: rest+30  SpO2 Range   >88%, interval training prn    O2 Flow Rate  N/A   Progression:   Aim to progress 0.2- 0.3 METs/week or as tolerated  Mode:  Aerobic exercise  Initial MET level determined by the exercise physiologist from 1:1 evaluation and/or 6MWT, physician approved as documented.     Modality METS Load    Duration   1 Pre-Exercise/Rest      20:00   2 Treadmill 2.9 2.4 mph   0%   40:00   3 Sci RB 3.6 " "3 level   50-60RPM   40:00   4 NuStep 1.8 2 level   60-80SPM   40:00   5 Physiostepper 2.5 2 level   30-50SPM   40:00   6 Recumbent Bike 3 2 level   50-60RPM   40:00   7 Magnum UBE N/a 40 load   40-50RPM   40:00   8 N/A      40:00   9 N/A      40:00   10 Final Cooldown      20:00     Resistance Training: no      Home Exercise Prescription/Self Report Log given: no     Goal Status: In progress  Exercise Goals: Improve 6MWT by 10%, Increase 30-40% in functional capacity overall, Safe & comfortable with exercise equipment, Uses RPE Denise Scale (0-10) & exercises at RPE of 2-4, Obtain 150 minutes/week of moderate intensity aerobic exercise, Increase 10-20% in functional capacity by next reassessment, Establish a home exercise program prior to discharge, Demonstrate pulse taking and/or purchase pulse watch/oximeter OWNS APPLE WATCH  Exercise Interventions/Education: Practice the RPE Denise Scale (0-10), Purchased pulse watch/oximeter     Other Core Components/Risk Factors Initial Assessment    Other Core Components Assessment  MEDICATION Adherence:              Taking medications as prescribed: yes   Uses pill box/organizer: not assessed   Carries medication list: yes     HYPERTENSION  Management:  Hx of Hypertension: No   Resting BP: 100/66  Peak Exercise BP:   122/66  Current Medications: Yes - metoprolol succinate    Hypertension medication changes: No     TOBACCO/SMOKING Cessation:  Tobacco Use:    No  Date started:     Date quit:    Quit Date set:   Medications:   Comment:      DIABETES Management:  Diabetes:  No  Medication: N/A  Medication changes: N/A  Hemoglobin A1C: No results found for: \"HGBA1C\"              Pt monitors BS at home:  N/A  Comment:  N/A    HYPERLIPIDEMIA Management:      Lipids: see lab values below   Medications:  Yes - Crestor  Medication changes:   No  Lab Results   Component Value Date    CHOL 261 (H) 09/20/2024    CHOL 163 06/29/2023     Lab Results   Component Value Date    HDL 49.2 " 09/20/2024    HDL 41 (L) 06/29/2023     Lab Results   Component Value Date    LDLCALC 169 (H) 09/20/2024    LDLCALC 94 06/29/2023     Lab Results   Component Value Date    TRIG 213 (H) 09/20/2024    TRIG 140 06/29/2023           Other Core Components Plan  Goal Status: In progress  Other Core Component Goals:   Achieve & maintain a resting blood pressure less than 130/80  Gain knowledge of cardiac disease and lifestyle modifications by discharge  Compliant with medications & carries medication list  Cholesterol <180, HDL >45, LDL <70, Trig <150  Lifestyle modification for risk factor reduction, Follow-up total lipid panel    Other Core Component Interventions/Education:   Uses weekly pill box/routine, Follow-up total lipid panel    Individual Patient Goals:  Increase strength & endurance  Aerobic exercise 3-6x per week  Leg strength, increase endurance, improve core strength for stability, goal wt 130#, decrease exertional SOB    General Comments:  Education class schedule given with manual by 1st session  Lecture handouts provided  Individual education & counseling    Rehab Staff Signature: Kaitlin Lutz RN

## 2025-05-23 ENCOUNTER — CLINICAL SUPPORT (OUTPATIENT)
Dept: CARDIAC REHAB | Facility: CLINIC | Age: 65
End: 2025-05-23
Payer: COMMERCIAL

## 2025-05-23 DIAGNOSIS — I50.20 ACC/AHA STAGE C SYSTOLIC HEART FAILURE: ICD-10-CM

## 2025-05-23 PROCEDURE — 93798 PHYS/QHP OP CAR RHAB W/ECG: CPT | Performed by: INTERNAL MEDICINE

## 2025-05-28 ENCOUNTER — CLINICAL SUPPORT (OUTPATIENT)
Dept: CARDIAC REHAB | Facility: CLINIC | Age: 65
End: 2025-05-28
Payer: COMMERCIAL

## 2025-05-28 DIAGNOSIS — I50.20 ACC/AHA STAGE C SYSTOLIC HEART FAILURE: ICD-10-CM

## 2025-05-28 PROCEDURE — 93798 PHYS/QHP OP CAR RHAB W/ECG: CPT | Performed by: INTERNAL MEDICINE

## 2025-05-30 ENCOUNTER — CLINICAL SUPPORT (OUTPATIENT)
Dept: CARDIAC REHAB | Facility: CLINIC | Age: 65
End: 2025-05-30
Payer: COMMERCIAL

## 2025-05-30 DIAGNOSIS — I50.20 ACC/AHA STAGE C SYSTOLIC HEART FAILURE: ICD-10-CM

## 2025-05-30 PROCEDURE — 93798 PHYS/QHP OP CAR RHAB W/ECG: CPT | Performed by: INTERNAL MEDICINE

## 2025-06-02 ENCOUNTER — CLINICAL SUPPORT (OUTPATIENT)
Dept: CARDIAC REHAB | Facility: CLINIC | Age: 65
End: 2025-06-02
Payer: COMMERCIAL

## 2025-06-02 DIAGNOSIS — I50.20 ACC/AHA STAGE C SYSTOLIC HEART FAILURE: ICD-10-CM

## 2025-06-02 PROCEDURE — 93798 PHYS/QHP OP CAR RHAB W/ECG: CPT | Performed by: INTERNAL MEDICINE

## 2025-06-04 ENCOUNTER — APPOINTMENT (OUTPATIENT)
Dept: CARDIAC REHAB | Facility: CLINIC | Age: 65
End: 2025-06-04
Payer: COMMERCIAL

## 2025-06-06 ENCOUNTER — CLINICAL SUPPORT (OUTPATIENT)
Dept: CARDIAC REHAB | Facility: CLINIC | Age: 65
End: 2025-06-06
Payer: COMMERCIAL

## 2025-06-06 DIAGNOSIS — I50.20 ACC/AHA STAGE C SYSTOLIC HEART FAILURE: ICD-10-CM

## 2025-06-06 PROCEDURE — 93798 PHYS/QHP OP CAR RHAB W/ECG: CPT | Performed by: INTERNAL MEDICINE

## 2025-06-09 ENCOUNTER — CLINICAL SUPPORT (OUTPATIENT)
Dept: CARDIAC REHAB | Facility: CLINIC | Age: 65
End: 2025-06-09
Payer: COMMERCIAL

## 2025-06-09 DIAGNOSIS — I50.20 ACC/AHA STAGE C SYSTOLIC HEART FAILURE: ICD-10-CM

## 2025-06-09 PROCEDURE — 93798 PHYS/QHP OP CAR RHAB W/ECG: CPT | Performed by: INTERNAL MEDICINE

## 2025-06-11 ENCOUNTER — CLINICAL SUPPORT (OUTPATIENT)
Dept: CARDIAC REHAB | Facility: CLINIC | Age: 65
End: 2025-06-11
Payer: COMMERCIAL

## 2025-06-11 DIAGNOSIS — I50.20 ACC/AHA STAGE C SYSTOLIC HEART FAILURE: ICD-10-CM

## 2025-06-11 PROCEDURE — 93798 PHYS/QHP OP CAR RHAB W/ECG: CPT | Performed by: INTERNAL MEDICINE

## 2025-06-13 ENCOUNTER — CLINICAL SUPPORT (OUTPATIENT)
Dept: CARDIAC REHAB | Facility: CLINIC | Age: 65
End: 2025-06-13
Payer: COMMERCIAL

## 2025-06-13 DIAGNOSIS — I50.20 ACC/AHA STAGE C SYSTOLIC HEART FAILURE: ICD-10-CM

## 2025-06-13 PROCEDURE — 93798 PHYS/QHP OP CAR RHAB W/ECG: CPT | Performed by: INTERNAL MEDICINE

## 2025-06-16 ENCOUNTER — CLINICAL SUPPORT (OUTPATIENT)
Dept: CARDIAC REHAB | Facility: CLINIC | Age: 65
End: 2025-06-16
Payer: COMMERCIAL

## 2025-06-16 DIAGNOSIS — I50.20 ACC/AHA STAGE C SYSTOLIC HEART FAILURE: ICD-10-CM

## 2025-06-16 PROCEDURE — 93798 PHYS/QHP OP CAR RHAB W/ECG: CPT | Performed by: INTERNAL MEDICINE

## 2025-06-18 ENCOUNTER — CLINICAL SUPPORT (OUTPATIENT)
Dept: CARDIAC REHAB | Facility: CLINIC | Age: 65
End: 2025-06-18
Payer: COMMERCIAL

## 2025-06-18 DIAGNOSIS — I50.20 ACC/AHA STAGE C SYSTOLIC HEART FAILURE: ICD-10-CM

## 2025-06-18 PROCEDURE — 93798 PHYS/QHP OP CAR RHAB W/ECG: CPT | Performed by: INTERNAL MEDICINE

## 2025-06-18 NOTE — PROGRESS NOTES
Cardiac Rehabilitation Individual Treatment Plan  30 Day Reassessment      Today's Date:   6/18/2025                       Program Location: [address], [place]    Name: Caroline Robles                            Medical Record Number: [id number]                          YOB: 1960    Referring Physician: Dominic Snider DO  [id number] [place] [address] [phone]  Primary Care Physician: Juli Teixeira MD    Referring Diagnosis / Date of Diagnosis  ACC/AHA STAGE C SYSTOLIC HEART FAILURE I50.20 1/9/25  N/A N/A      AACVPR Risk Stratification:  High    Learning Assessment:  Cardiac Knowledge Test:       Pre:  14/15                           Post:        /15  Learning assessment/barriers: None    Preferred learning method: Auditory, Visual, and Reading handout      Education Classes: Schedule given with education manual, Benefits of Exercise 6/2/25, Medications 6/9/25, Music Therapy 6/11/25    Psychosocial Reassessment    Psychosocial Assessment  Pt reported/currently experiencing stress : Yes, Stress and Depression.   Current Stress Level (1-10 scale):  6-7/10  History of anxiety/depression: Yes, Stress, Depression, and WORK AND FAMILY STRESS.  Currently seeing a mental health provider: No   Psychosocial Meds: Yes - PAXIL  Medication changes: No    Social Support: Yes, Whom:CHILDREN, BUT LIMITED OVERALL SOCIAL SUPPORT     Psychosocial Test:  PHQ-9  PHQ-9 score:   Pre:  6  Mid:  N/A  Post:   N/A  Interpretation:    5-9 PHQ-9 mild depression     Psychosocial Plan   Goal Status:   In progress   Psychosocial Goals:  Attend Art Therapy, Attend Chair Yoga, Develop positive support system, Improve stress level, Learn breathing techniques and stretches, Stay compliant with medications, Participate in music therapy cooldown   Psychosocial Interventions/Education:  Family support, Staff and patient discussion ENCOURAGE COMMUNITY BOOK CLUBS OR MEHTA PROGRAMS FOR SUPPORT AND HOBBY  "ENGAGEMENT      Nutrition Reassessment    Nutrition Assessment  Picture Your Plate Score:  Pre:  65 Post:  Picture Your Plate Interpretation:   PYP >60 healthful dietary patterns   Special Diet:   NO SPECIAL DIET   Dietary Goal:  Mediterranean Diet;  PYP >60     Weight Management:   Weight:  157 lb   Height:  63.5 \"  BMI:  27.9  Weighing Daily: No    Goal Weight:   130    Heart Failure Assessment:   Most recent EF: 40%  Date: 4/17/25  Hx of Heart Failure: Yes    Heart Failure medications: Yes - Entresto, Farxiga, spironolactone    Heart Failure medication changes: No     Comments: has home scale, encouraged daily wts    Nutrition Plan   Goal Status: In progress  Nutrition Goals:   goal weight 130#, follow low fat/low sodium diet, develop heart healthy diet, Mediterranean diet , improve PYP score, daily weights    RD Recommendations:  RD rev\"d PYP and pt goal is to reduce sweets intake      Nutrition Interventions/Education: complete PYP diet survey, Encouraged dietician lecture attendance      Exercise Reassessment    Exercise Assessment    Fall Risk Results:  medium  Initial Intake: Starting MET Level 2.7   Current MET level 3.3     Discharge MET level n/a    6-Min Walk Test:     PRE:     Feet:    1485       METS:  3.1                 POST:  Feet:    n/a       METS:  n/a                    Current Home Exercise:   Self Report given 6/13/25  Mode: n/a  Frequency (days/week):   n/a  Duration(mins/day):    n/a    Exercise Plan  Exercise Prescription based on 6MWT and/or evaluation   Frequency:   2-3   days/week   Duration:   40  minutes    Intensity: RPE (0-10 Denise Scale):  2-4 (light to somewhat hard)  Target HR: 100-115  Intensity: 74% age  SpO2 Range   >88%, interval training prn    O2 Flow Rate  N/A   Progression:   Aim to progress 0.2- 0.3 METs/week or as tolerated  Mode:  Aerobic exercise  Initial MET level determined by the exercise physiologist from 1:1 evaluation and/or 6MWT, physician approved as " documented.     Modality METS Load    Duration   1 Pre-Exercise/Rest      20:00   2 Treadmill 2.2 1.6  mph   0%   40:00   3 Sci RB 4.2 5 level   50-60RPM   40:00   4 NuStep 2.9 6 level   60-80SPM   40:00   5 Physiostepper N/a NA   30-50SPM   40:00   6 Recumbent Bike 4.2 5 level   50-60RPM   40:00   7 Magnum UBE N/a 40 load   40-50RPM   40:00   8 N/A      40:00   9 N/A      40:00   10 Final Cooldown      20:00     Resistance Training: no      Home Exercise Prescription/Self Report Log given: yes, type: walking, frequency 2x/wk, duration 20-40 min, intensity RPE 2-4     Goal Status: In progress  Exercise Goals: Improve 6MWT by 10%, Increase 30-40% in functional capacity overall, Safe & comfortable with exercise equipment, Uses RPE Denise Scale (0-10) & exercises at RPE of 2-4, Obtain 150 minutes/week of moderate intensity aerobic exercise, Increase 10-20% in functional capacity by next reassessment, Establish a home exercise program prior to discharge, Demonstrate pulse taking and/or purchase pulse watch/oximeter OWNS APPLE WATCH  Exercise Interventions/Education: Practice the RPE Denise Scale (0-10), Exercises within RPE 2-4, Increased endurance by 33%, Oriented to exercise equipment, Purchased pulse watch/oximeter, Self-report log given, with target exercise heart rate range included, Reached 3.3 MET level     Other Core Components/Risk Factors Initial Assessment    Other Core Components Assessment  MEDICATION Adherence:              Taking medications as prescribed: no, has not started taking statin yet, d/t routine   Uses pill box/organizer: Yes    Carries medication list: yes     HYPERTENSION  Management:  Hx of Hypertension: No   Resting BP: 98/62  Peak Exercise BP:   124/66  Current Medications: Yes - metoprolol succinate    Hypertension medication changes: No     TOBACCO/SMOKING Cessation:  Tobacco Use:    No  Date started:     Date quit:    Quit Date set:   Medications:   Comment:      DIABETES  "Management:  Diabetes:  No  Medication: N/A  Medication changes: N/A  Hemoglobin A1C: No results found for: \"HGBA1C\"              Pt monitors BS at home:  N/A  Comment:  N/A    HYPERLIPIDEMIA Management:      Lipids: see lab values below   Medications:  No Not taking CRESTOR as prescribed  Medication changes:   No  Lab Results   Component Value Date    CHOL 261 (H) 09/20/2024    CHOL 163 06/29/2023     Lab Results   Component Value Date    HDL 49.2 09/20/2024    HDL 41 (L) 06/29/2023     Lab Results   Component Value Date    LDLCALC 169 (H) 09/20/2024    LDLCALC 94 06/29/2023     Lab Results   Component Value Date    TRIG 213 (H) 09/20/2024    TRIG 140 06/29/2023           Other Core Components Plan  Goal Status: In progress  Other Core Component Goals:   Achieve & maintain a resting blood pressure less than 130/80  Gain knowledge of cardiac disease and lifestyle modifications by discharge  Compliant with medications & carries medication list  Cholesterol <180, HDL >45, LDL <70, Trig <150  Lifestyle modification for risk factor reduction, Follow-up total lipid panel    Other Core Component Interventions/Education:   Educational handouts provided, Uses weekly pill box/routine, Follow-up total lipid panel, Staff:Patient Discussion    Individual Patient Goals:  Increase strength & endurance  Aerobic exercise 3-6x per week  Leg strength, increase endurance, improve core strength for stability, goal wt 130#, decrease exertional SOB    General Comments:  Education class schedule given with manual by 1st session  Lecture handouts provided  Individual education & counseling    Rehab Staff Signature: Kaitlin Lutz RN       "

## 2025-06-20 ENCOUNTER — CLINICAL SUPPORT (OUTPATIENT)
Dept: CARDIAC REHAB | Facility: CLINIC | Age: 65
End: 2025-06-20
Payer: COMMERCIAL

## 2025-06-20 DIAGNOSIS — I50.20 ACC/AHA STAGE C SYSTOLIC HEART FAILURE: ICD-10-CM

## 2025-06-20 PROCEDURE — 93798 PHYS/QHP OP CAR RHAB W/ECG: CPT | Performed by: INTERNAL MEDICINE

## 2025-06-23 ENCOUNTER — CLINICAL SUPPORT (OUTPATIENT)
Dept: CARDIAC REHAB | Facility: CLINIC | Age: 65
End: 2025-06-23
Payer: COMMERCIAL

## 2025-06-23 DIAGNOSIS — I50.20 ACC/AHA STAGE C SYSTOLIC HEART FAILURE: ICD-10-CM

## 2025-06-25 ENCOUNTER — CLINICAL SUPPORT (OUTPATIENT)
Dept: CARDIAC REHAB | Facility: CLINIC | Age: 65
End: 2025-06-25
Payer: COMMERCIAL

## 2025-06-25 DIAGNOSIS — I50.20 ACC/AHA STAGE C SYSTOLIC HEART FAILURE: ICD-10-CM

## 2025-06-25 PROCEDURE — 93798 PHYS/QHP OP CAR RHAB W/ECG: CPT | Performed by: INTERNAL MEDICINE

## 2025-06-27 ENCOUNTER — CLINICAL SUPPORT (OUTPATIENT)
Dept: CARDIAC REHAB | Facility: CLINIC | Age: 65
End: 2025-06-27
Payer: COMMERCIAL

## 2025-06-27 DIAGNOSIS — I50.20 ACC/AHA STAGE C SYSTOLIC HEART FAILURE: ICD-10-CM

## 2025-06-27 PROCEDURE — 93798 PHYS/QHP OP CAR RHAB W/ECG: CPT | Performed by: INTERNAL MEDICINE

## 2025-06-30 ENCOUNTER — APPOINTMENT (OUTPATIENT)
Dept: CARDIAC REHAB | Facility: CLINIC | Age: 65
End: 2025-06-30
Payer: COMMERCIAL

## 2025-06-30 DIAGNOSIS — I50.20 ACC/AHA STAGE C SYSTOLIC HEART FAILURE: ICD-10-CM

## 2025-06-30 PROCEDURE — 93798 PHYS/QHP OP CAR RHAB W/ECG: CPT | Performed by: INTERNAL MEDICINE

## 2025-07-02 ENCOUNTER — CLINICAL SUPPORT (OUTPATIENT)
Dept: CARDIAC REHAB | Facility: CLINIC | Age: 65
End: 2025-07-02
Payer: COMMERCIAL

## 2025-07-02 DIAGNOSIS — I50.20 ACC/AHA STAGE C SYSTOLIC HEART FAILURE: ICD-10-CM

## 2025-07-07 ENCOUNTER — CLINICAL SUPPORT (OUTPATIENT)
Dept: CARDIAC REHAB | Facility: CLINIC | Age: 65
End: 2025-07-07
Payer: COMMERCIAL

## 2025-07-07 DIAGNOSIS — I50.20 ACC/AHA STAGE C SYSTOLIC HEART FAILURE: ICD-10-CM

## 2025-07-09 ENCOUNTER — CLINICAL SUPPORT (OUTPATIENT)
Dept: CARDIAC REHAB | Facility: CLINIC | Age: 65
End: 2025-07-09
Payer: COMMERCIAL

## 2025-07-09 DIAGNOSIS — I50.20 ACC/AHA STAGE C SYSTOLIC HEART FAILURE: ICD-10-CM

## 2025-07-09 PROCEDURE — 93798 PHYS/QHP OP CAR RHAB W/ECG: CPT | Performed by: INTERNAL MEDICINE

## 2025-07-11 ENCOUNTER — CLINICAL SUPPORT (OUTPATIENT)
Dept: CARDIAC REHAB | Facility: CLINIC | Age: 65
End: 2025-07-11
Payer: COMMERCIAL

## 2025-07-11 DIAGNOSIS — I50.20 ACC/AHA STAGE C SYSTOLIC HEART FAILURE: ICD-10-CM

## 2025-07-11 PROCEDURE — 93798 PHYS/QHP OP CAR RHAB W/ECG: CPT | Performed by: INTERNAL MEDICINE

## 2025-07-14 ENCOUNTER — CLINICAL SUPPORT (OUTPATIENT)
Dept: CARDIAC REHAB | Facility: CLINIC | Age: 65
End: 2025-07-14
Payer: COMMERCIAL

## 2025-07-14 DIAGNOSIS — I50.20 ACC/AHA STAGE C SYSTOLIC HEART FAILURE: ICD-10-CM

## 2025-07-14 PROCEDURE — 93798 PHYS/QHP OP CAR RHAB W/ECG: CPT | Performed by: INTERNAL MEDICINE

## 2025-07-16 ENCOUNTER — DOCUMENTATION (OUTPATIENT)
Dept: CARDIAC REHAB | Facility: CLINIC | Age: 65
End: 2025-07-16
Payer: COMMERCIAL

## 2025-07-16 ENCOUNTER — APPOINTMENT (OUTPATIENT)
Dept: CARDIAC REHAB | Facility: CLINIC | Age: 65
End: 2025-07-16
Payer: COMMERCIAL

## 2025-07-16 NOTE — PROGRESS NOTES
Cardiac Rehabilitation Individual Treatment Plan  60 Day Reassessment      Today's Date:   7/16/2025                       Program Location: [address], [place]    Name: Caroline Robles                            Medical Record Number: [id number]                          YOB: 1960    Referring Physician: No referring provider defined for this encounter. N/A  Primary Care Physician: Juli Teixeira MD    Referring Diagnosis / Date of Diagnosis  ACC/AHA STAGE C SYSTOLIC HEART FAILURE I50.20 1/9/25  N/A N/A      AACVPR Risk Stratification:  High    Learning Assessment:  Cardiac Knowledge Test:       Pre:  14/15                           Post:        /15  Learning assessment/barriers: None    Preferred learning method: Auditory, Visual, and Reading handout      Education Classes: Schedule given with education manual, Benefits of Exercise 6/2/25, Medications 6/9/25, Music Therapy 6/11/25    Psychosocial Reassessment    Psychosocial Assessment  Pt reported/currently experiencing stress : Yes, Stress and Depression.   Current Stress Level (1-10 scale):  6-7/10  History of anxiety/depression: Yes, Stress, Depression, and WORK AND FAMILY STRESS.  Currently seeing a mental health provider: No   Psychosocial Meds: Yes - PAXIL  Medication changes: No    Social Support: Yes, Whom:CHILDREN, BUT LIMITED OVERALL SOCIAL SUPPORT     Psychosocial Test:  PHQ-9  PHQ-9 score:   Pre:  6  Mid:  N/A  Post:   N/A  Interpretation:    5-9 PHQ-9 mild depression     Psychosocial Plan   Goal Status:   In progress   Psychosocial Goals:  Attend Art Therapy, Attend Chair Yoga, Develop positive support system, Improve stress level, Learn breathing techniques and stretches, Stay compliant with medications, Participate in music therapy cooldown   Psychosocial Interventions/Education:  Family support, Staff and patient discussion, Participated in Music Therapy cool down 6/11/25, 7/9/25, Practiced meditation during cool down  "6/30/2025 ENCOURAGE COMMUNITY [employer] CLUBS OR MEHTA PROGRAMS FOR SUPPORT AND HOBBY ENGAGEMENT      Nutrition Reassessment    Nutrition Assessment  Picture Your Plate Score:  Pre:  65 Post:  Picture Your Plate Interpretation:   PYP >60 healthful dietary patterns   Special Diet:   NO SPECIAL DIET   Dietary Goal:  Mediterranean Diet;  PYP >60     Weight Management:   Weight:  157 lb   Height:  63.5 \"  BMI:  27.9  Weighing Daily: No    Goal Weight:   130    Heart Failure Assessment:   Most recent EF: 40%  Date: 4/17/25  Hx of Heart Failure: Yes    Heart Failure medications: Yes - Entresto, Farxiga, spironolactone    Heart Failure medication changes: No     Comments: has home scale, encouraged daily wts    Nutrition Plan   Goal Status: In progress  Nutrition Goals:   goal weight 130#, follow low fat/low sodium diet, develop heart healthy diet, Mediterranean diet , improve PYP score, daily weights    RD Recommendations:  RD rev\"d PYP and pt goal is to reduce sweets intake      Nutrition Interventions/Education: complete PYP diet survey, Encouraged dietician lecture attendance; ENCOURAGED PATIENT TO ADJUST WORK SCHEDULE FOR RD LECTURE 7/21/2025      Exercise Reassessment    Exercise Assessment    Fall Risk Results:  medium  Initial Intake: Starting MET Level 2.7   Current MET level 3.9    Discharge MET level n/a    6-Min Walk Test:     PRE:     Feet:    1485       METS:  3.1                 POST:  Feet:    n/a       METS:  n/a                    Current Home Exercise:   Self Report given 6/13/25 AND RETURNED TO STAFF  Mode: WALKING  Frequency (days/week):   2X WEEK  Duration(mins/day):    30 MINS    Exercise Plan  Exercise Prescription based on 6MWT and/or evaluation   Frequency:   2-3   days/week   Duration:   40  minutes    Intensity: RPE (0-10 Denise Scale):  2-4 (light to somewhat hard)  Target HR: 100-120  Intensity: 77% age  SpO2 Range   >88%, interval training prn    O2 Flow Rate  N/A   Progression:   Aim to progress " 0.2- 0.3 METs/week or as tolerated  Mode:  Aerobic exercise  Initial MET level determined by the exercise physiologist from 1:1 evaluation and/or 6MWT, physician approved as documented.     Modality METS Load    Duration   1 Pre-Exercise/Rest      20:00   2 Treadmill 2.6 2 mph   0%   40:00   3 Sci RB 4.5 5.5 level   50-60RPM   40:00   4 NuStep 4.4 7 level   60-80SPM   40:00   5 Physiostepper N/a NA   30-50SPM   40:00   6 Recumbent Bike 4.2 5 level   50-60RPM   40:00   7 Magnum UBE N/a 0 load   40-50RPM   40:00   8 N/A      40:00   9 N/A      40:00   10 Final Cooldown      20:00     Resistance Training: no      Home Exercise Prescription/Self Report Log given: yes, type: walking, frequency 2x/wk, duration 20-40 min, intensity RPE 2-4     Goal Status: In progress  Exercise Goals: Improve 6MWT by 10%, Increase 30-40% in functional capacity overall, Safe & comfortable with exercise equipment, Uses RPE Denise Scale (0-10) & exercises at RPE of 2-4, Obtain 150 minutes/week of moderate intensity aerobic exercise, Increase 10-20% in functional capacity by next reassessment, Establish a home exercise program prior to discharge, Demonstrate pulse taking and/or purchase pulse watch/oximeter OWNS APPLE WATCH  Exercise Interventions/Education: Increased endurance by 30% SINCE LAST ASSESSMENT, Practice the RPE Denise Scale (0-10), Exercises within RPE 2-4, Oriented to exercise equipment, Purchased pulse watch/oximeter, Self-report log given, with target exercise heart rate range included, Reached 3.9 MET level     Other Core Components/Risk Factors Initial Assessment    Other Core Components Assessment  MEDICATION Adherence:              Taking medications as prescribed: yes STARTED TAKING CRESTOR 7/9/2025   Uses pill box/organizer: Yes    Carries medication list: yes     HYPERTENSION  Management:  Hx of Hypertension: No   Resting BP: N/A  Peak Exercise BP:   N/A  Current Medications: Yes - metoprolol succinate    Hypertension  "medication changes: No     TOBACCO/SMOKING Cessation:  Tobacco Use:    No  Date started:     Date quit:    Quit Date set:   Medications:   Comment:  NEVER SMOKED    DIABETES Management:  Diabetes:  No  Medication: N/A  Medication changes: N/A  Hemoglobin A1C: No results found for: \"HGBA1C\"              Pt monitors BS at home:  N/A  Comment:  N/A    HYPERLIPIDEMIA Management:      Lipids: see lab values below   Medications:  YES, taking CRESTOR as prescribed  Medication changes:   No  Lab Results   Component Value Date    CHOL 261 (H) 09/20/2024    CHOL 163 06/29/2023     Lab Results   Component Value Date    HDL 49.2 09/20/2024    HDL 41 (L) 06/29/2023     Lab Results   Component Value Date    LDLCALC 169 (H) 09/20/2024    LDLCALC 94 06/29/2023     Lab Results   Component Value Date    TRIG 213 (H) 09/20/2024    TRIG 140 06/29/2023           Other Core Components Plan  Goal Status: In progress  Other Core Component Goals:   Achieve & maintain a resting blood pressure less than 130/80  Gain knowledge of cardiac disease and lifestyle modifications by discharge  Compliant with medications & carries medication list  Cholesterol <180, HDL >45, LDL <70, Trig <150  Lifestyle modification for risk factor reduction, Follow-up total lipid panel    Other Core Component Interventions/Education:   Educational handouts provided, Uses weekly pill box/routine, Follow-up total lipid panel, Staff:Patient Discussion    Individual Patient Goals:  Increase strength & endurance  Aerobic exercise 3-6x per week  Leg strength, increase endurance, improve core strength for stability, goal wt 130#, decrease exertional SOB    General Comments:  Education class schedule given with manual by 1st session  Lecture handouts provided  Individual education & counseling    Rehab Staff Signature: Yani Trimble       "

## 2025-07-18 ENCOUNTER — CLINICAL SUPPORT (OUTPATIENT)
Dept: CARDIAC REHAB | Facility: CLINIC | Age: 65
End: 2025-07-18
Payer: COMMERCIAL

## 2025-07-18 DIAGNOSIS — I50.20 ACC/AHA STAGE C SYSTOLIC HEART FAILURE: ICD-10-CM

## 2025-07-21 ENCOUNTER — CLINICAL SUPPORT (OUTPATIENT)
Dept: CARDIAC REHAB | Facility: CLINIC | Age: 65
End: 2025-07-21
Payer: COMMERCIAL

## 2025-07-21 DIAGNOSIS — I50.20 ACC/AHA STAGE C SYSTOLIC HEART FAILURE: ICD-10-CM

## 2025-07-21 PROCEDURE — 93798 PHYS/QHP OP CAR RHAB W/ECG: CPT | Performed by: INTERNAL MEDICINE

## 2025-07-23 ENCOUNTER — CLINICAL SUPPORT (OUTPATIENT)
Dept: CARDIAC REHAB | Facility: CLINIC | Age: 65
End: 2025-07-23
Payer: COMMERCIAL

## 2025-07-23 DIAGNOSIS — I50.20 ACC/AHA STAGE C SYSTOLIC HEART FAILURE: ICD-10-CM

## 2025-07-23 PROCEDURE — 93798 PHYS/QHP OP CAR RHAB W/ECG: CPT | Performed by: INTERNAL MEDICINE

## 2025-07-25 ENCOUNTER — CLINICAL SUPPORT (OUTPATIENT)
Dept: CARDIAC REHAB | Facility: CLINIC | Age: 65
End: 2025-07-25
Payer: COMMERCIAL

## 2025-07-25 DIAGNOSIS — I50.20 ACC/AHA STAGE C SYSTOLIC HEART FAILURE: ICD-10-CM

## 2025-07-25 PROCEDURE — 93798 PHYS/QHP OP CAR RHAB W/ECG: CPT | Performed by: INTERNAL MEDICINE

## 2025-07-28 ENCOUNTER — CLINICAL SUPPORT (OUTPATIENT)
Dept: CARDIAC REHAB | Facility: CLINIC | Age: 65
End: 2025-07-28
Payer: COMMERCIAL

## 2025-07-28 DIAGNOSIS — I50.20 ACC/AHA STAGE C SYSTOLIC HEART FAILURE: ICD-10-CM

## 2025-07-30 ENCOUNTER — TELEMEDICINE (OUTPATIENT)
Dept: PRIMARY CARE | Facility: CLINIC | Age: 65
End: 2025-07-30
Payer: COMMERCIAL

## 2025-07-30 ENCOUNTER — CLINICAL SUPPORT (OUTPATIENT)
Dept: CARDIAC REHAB | Facility: CLINIC | Age: 65
End: 2025-07-30
Payer: COMMERCIAL

## 2025-07-30 DIAGNOSIS — U07.1 COVID: Primary | ICD-10-CM

## 2025-07-30 DIAGNOSIS — I50.20 ACC/AHA STAGE C SYSTOLIC HEART FAILURE: ICD-10-CM

## 2025-07-30 PROCEDURE — 99213 OFFICE O/P EST LOW 20 MIN: CPT | Performed by: INTERNAL MEDICINE

## 2025-07-30 RX ORDER — NIRMATRELVIR AND RITONAVIR 300-100 MG
3 KIT ORAL 2 TIMES DAILY
Qty: 30 TABLET | Refills: 0 | Status: SHIPPED | OUTPATIENT
Start: 2025-07-30 | End: 2025-08-04

## 2025-07-30 NOTE — PROGRESS NOTES
Subjective   Patient ID: Caroline Robles is a 64 y.o. female who presents for No chief complaint on file..    HPI   For past few days patient is having symptoms of cold but started having more cough congestion.  Tested positive for COVID this morning     Past recap  Patient is patient is here with complaints of having discharge from the nipple on the left side for past 1 month color is light yellow to light brown.  Patient has history of lumpectomy on the left side and had radiation  On laying on the left side she noticed pink lump which she does not think is related to the surgery     Past recap   patient is here for follow-up   She had her follow-up with the cardiologist and he started her on half dose of lisinopril 2.5 mg to help with her cardiomyopathy.  She did 2D echo results are pending  She had colonoscopy done which is negative  Her mammogram is normal  She is going on a cruise and worried about seasickness  She wants prescription for shingles vaccine otherwise overall she is feeling   Patient is complaining of pain in the left side of the neck and ear for the past few days today it feels little better sharp pain  She is also asking for Wegovy as it is approved for the cardiac indication and with her cardiomyopathy if she will be a candidate because she would like to lose some weight    Past recap    patient is here for hospital follow-up  She was admitted for obstructive uropathy acute diverticulitis and also went into atrial flutter.  Patient has history of SVT in the past had 2 ablations but this time she was in atrial flutter.  Started on high dose of beta-blocker and anticoagulants  Patient did make follow-up with her EPS cardiologist  Her symptoms of acute diverticulitis have resolved  She is not having any pain from the kidney stone which is expected to pass on its own   Patient is still feeling wiped out her legs feel very weak shaky she gets out of breath    patient is here with complaints of  having head congestion sinus congestion cough postnasal drainage  3 COVID test negative  Patient is treated for cardiomyopathy and SVT     Patient is here for hospital follow-up  In June she fell and broke her right patella  Wound needed debridement she also had left ulna and radius fracture  Fall happened in her driveway where her shoe got caught in uneven surface  She is able to walk now weightbearing  She is using immobilizer but no more using the walker  She had her third cast change next she is working with OT for shower with assistance     Patient is here for follow-up  He was in the hospital again for SVT  She has a follow-up coming up with her cardiologist to discuss more options because she had a failed ablation  She is doing blood work before her appointment  She needs refills on Valtrex she has history of genital herpes and she has been on maintenance therapy for a long time and she is not able to the medication  Her son got diagnosed with Covid but he is not seen her for 2 weeks  She is not doing any more iron infusions  She is off her breast cancer medication because she was having terrible hot flashes  She is also taken off the digoxin by the cardiologist  Also complaining of burning sensation on the back of the right leg  She does sit a lot at work        Social history non-smoker nondrinker     past hx  Patient here for follow-up on blood work continues to have bilateral foot pain and knee pain  Her morning stiffness foot and ankle pain started after she got iron transfusion fibrillation some month ago  She's also feeling numbness and tingling in both the legs  She finished antibiotic still feels some fullness in the neck throat is feeling much better sinuses feeling better     Patient has a cardiac ablation done but it failed  She had iron transfusion  She was started on digoxin but then patient developed swelling and pain in the right knee and the ankle so she quit taking digoxin and taking Motrin  but seems to be helping a little  She is here to get her right knee checked     Patient here for follow-up on CAT scan of the chest   She continues to have episodes of cough  Sometimes she gets quite nauseous that she throws up  On February 21 she is scheduled for cardiac ablation  Cardiologist does not think that shortness of breath is related to heart     PMH   Patient has history of breast cancer  Patient lost her  has moved from the [place] to [facility]   Weeks ago patient was [facility] for SVT  History of breast cancer status post left lumpectomy  She is under care of electrophysiologist cardiologist for arrhythmia   Review of Systems    Objective   There were no vitals taken for this visit.        Assessment/Plan   Problem List Items Addressed This Visit    None  Visit Diagnoses         COVID    -  Primary    Relevant Medications    nirmatrelvir-ritonavir (Paxlovid) 300 mg (150 mg x 2)-100 mg tablet therapy pack            past recap  I'm wondering if patient had side effects to the IV iron treatment or possible viral symptoms related to her sickness or the anesthesia she was through for her heart procedure  She is definitely looking better feeling better her blood work has improved  Will repeat her CBC CMP iron studies before giving her next iron infusion  Will also check cholesterol in 3 months  Will repeat KILO titers  Continue with exercise program     11/12  Burning pain on the right leg most likely from neuropathy  Advised patient to move around more and see if the pain gets better  Valtrex prescription given patient does not want to go off the medication  Screening colonoscopy she is due for ordered  Bone density ordered  Her heart rate is fine today  We will order blood work for cholesterol  Anemia has resolved no need for IV iron  Follow-up in 6 months     8/18  Clinically appears patient is healing well  Continue PT OT  Home care orders ordered  Blood pressure stable  Continue current  medications  Follow-up after blood work       7/8/2023  EKG done shows normal sinus rhythm  Symptoms of kidney stone and diverticulitis has resolved  Refer to colonoscopy  Fatigue could be related to the heart issue  Her ejection fraction was reduced to 45%  Follow-up with the cardiologist  If it still not better will do further work-up  Follow-up in a month    11/14/2023  Scopolamine patch for the seasickness  Tenderness in the left ear and neck is related to TMJ  Advised to follow-up with the dentist  Warm compresses and soft diet  Patient is still taking Valtrex for many years on every day basis  Explained her that she really needs to take as needed now because she has finished suppression therapy  Shingles vaccine prescribe  Blood pressure stable cholesterol is stable  Follow-up blood work in 6 months  Explained that Wegovy is not suitable for her her BMI is not that high and is not going to help her cardiomyopathy    7/1/2024  On examination patient does have a lump and pressing the lump she has more nipple discharge  Will get ultrasound left breast  Diagnostic mammogram  Prolactin levels to check  Follow-up after the test    7/20/2025  Will treat with Paxlovid  Hold statin for 10 days  Rest fluids  If developed any chest pain or shortness of breath please follow-up

## 2025-08-01 ENCOUNTER — APPOINTMENT (OUTPATIENT)
Dept: CARDIAC REHAB | Facility: CLINIC | Age: 65
End: 2025-08-01
Payer: COMMERCIAL

## 2025-08-04 ENCOUNTER — APPOINTMENT (OUTPATIENT)
Dept: CARDIAC REHAB | Facility: CLINIC | Age: 65
End: 2025-08-04
Payer: COMMERCIAL

## 2025-08-06 ENCOUNTER — APPOINTMENT (OUTPATIENT)
Dept: CARDIAC REHAB | Facility: CLINIC | Age: 65
End: 2025-08-06
Payer: COMMERCIAL

## 2025-08-08 ENCOUNTER — APPOINTMENT (OUTPATIENT)
Dept: CARDIAC REHAB | Facility: CLINIC | Age: 65
End: 2025-08-08
Payer: COMMERCIAL

## 2025-08-11 ENCOUNTER — CLINICAL SUPPORT (OUTPATIENT)
Dept: CARDIAC REHAB | Facility: CLINIC | Age: 65
End: 2025-08-11
Payer: COMMERCIAL

## 2025-08-11 DIAGNOSIS — I50.20 ACC/AHA STAGE C SYSTOLIC HEART FAILURE: ICD-10-CM

## 2025-08-13 ENCOUNTER — CLINICAL SUPPORT (OUTPATIENT)
Dept: CARDIAC REHAB | Facility: CLINIC | Age: 65
End: 2025-08-13
Payer: COMMERCIAL

## 2025-08-13 DIAGNOSIS — I50.20 ACC/AHA STAGE C SYSTOLIC HEART FAILURE: ICD-10-CM

## 2025-08-15 ENCOUNTER — CLINICAL SUPPORT (OUTPATIENT)
Dept: CARDIAC REHAB | Facility: CLINIC | Age: 65
End: 2025-08-15
Payer: COMMERCIAL

## 2025-08-15 DIAGNOSIS — I50.20 ACC/AHA STAGE C SYSTOLIC HEART FAILURE: ICD-10-CM

## 2025-08-15 PROCEDURE — 93798 PHYS/QHP OP CAR RHAB W/ECG: CPT | Performed by: INTERNAL MEDICINE

## 2025-08-18 ENCOUNTER — CLINICAL SUPPORT (OUTPATIENT)
Dept: CARDIAC REHAB | Facility: CLINIC | Age: 65
End: 2025-08-18
Payer: COMMERCIAL

## 2025-08-18 DIAGNOSIS — I50.20 ACC/AHA STAGE C SYSTOLIC HEART FAILURE: ICD-10-CM

## 2025-08-20 ENCOUNTER — CLINICAL SUPPORT (OUTPATIENT)
Dept: CARDIAC REHAB | Facility: CLINIC | Age: 65
End: 2025-08-20
Payer: COMMERCIAL

## 2025-08-20 DIAGNOSIS — I50.20 ACC/AHA STAGE C SYSTOLIC HEART FAILURE: ICD-10-CM

## 2025-08-20 PROCEDURE — 93798 PHYS/QHP OP CAR RHAB W/ECG: CPT | Performed by: INTERNAL MEDICINE

## 2025-08-22 ENCOUNTER — APPOINTMENT (OUTPATIENT)
Dept: CARDIAC REHAB | Facility: CLINIC | Age: 65
End: 2025-08-22
Payer: COMMERCIAL

## 2025-08-22 ENCOUNTER — CLINICAL SUPPORT (OUTPATIENT)
Dept: CARDIAC REHAB | Facility: CLINIC | Age: 65
End: 2025-08-22
Payer: COMMERCIAL

## 2025-08-22 DIAGNOSIS — I50.20 ACC/AHA STAGE C SYSTOLIC HEART FAILURE: ICD-10-CM

## 2025-08-22 PROCEDURE — 93798 PHYS/QHP OP CAR RHAB W/ECG: CPT | Performed by: INTERNAL MEDICINE

## 2025-08-25 ENCOUNTER — CLINICAL SUPPORT (OUTPATIENT)
Dept: CARDIAC REHAB | Facility: CLINIC | Age: 65
End: 2025-08-25
Payer: COMMERCIAL

## 2025-08-25 DIAGNOSIS — I50.20 ACC/AHA STAGE C SYSTOLIC HEART FAILURE: ICD-10-CM

## 2025-08-25 PROCEDURE — 93798 PHYS/QHP OP CAR RHAB W/ECG: CPT | Performed by: INTERNAL MEDICINE

## 2025-08-25 RX ORDER — SACUBITRIL AND VALSARTAN 24; 26 MG/1; MG/1
1 TABLET ORAL 2 TIMES DAILY
Qty: 180 TABLET | Refills: 3 | Status: SHIPPED | OUTPATIENT
Start: 2025-08-25 | End: 2026-08-25

## 2025-08-27 ENCOUNTER — APPOINTMENT (OUTPATIENT)
Dept: CARDIAC REHAB | Facility: CLINIC | Age: 65
End: 2025-08-27
Payer: COMMERCIAL

## 2025-08-27 DIAGNOSIS — I50.20 ACC/AHA STAGE C SYSTOLIC HEART FAILURE: ICD-10-CM

## 2025-08-27 PROCEDURE — 93798 PHYS/QHP OP CAR RHAB W/ECG: CPT | Performed by: INTERNAL MEDICINE

## 2025-08-28 ENCOUNTER — APPOINTMENT (OUTPATIENT)
Dept: PRIMARY CARE | Facility: CLINIC | Age: 65
End: 2025-08-28
Payer: COMMERCIAL

## 2025-08-28 ASSESSMENT — PATIENT HEALTH QUESTIONNAIRE - PHQ9
2. FEELING DOWN, DEPRESSED OR HOPELESS: NOT AT ALL
1. LITTLE INTEREST OR PLEASURE IN DOING THINGS: NOT AT ALL
SUM OF ALL RESPONSES TO PHQ9 QUESTIONS 1 AND 2: 0

## 2025-08-29 ENCOUNTER — APPOINTMENT (OUTPATIENT)
Dept: CARDIAC REHAB | Facility: CLINIC | Age: 65
End: 2025-08-29
Payer: COMMERCIAL

## 2025-09-03 ENCOUNTER — APPOINTMENT (OUTPATIENT)
Dept: CARDIAC REHAB | Facility: CLINIC | Age: 65
End: 2025-09-03
Payer: COMMERCIAL

## 2025-09-05 ENCOUNTER — APPOINTMENT (OUTPATIENT)
Dept: CARDIAC REHAB | Facility: CLINIC | Age: 65
End: 2025-09-05
Payer: COMMERCIAL

## 2025-09-08 ENCOUNTER — APPOINTMENT (OUTPATIENT)
Dept: CARDIAC REHAB | Facility: CLINIC | Age: 65
End: 2025-09-08
Payer: COMMERCIAL

## 2025-09-10 ENCOUNTER — APPOINTMENT (OUTPATIENT)
Dept: CARDIAC REHAB | Facility: CLINIC | Age: 65
End: 2025-09-10
Payer: COMMERCIAL

## 2025-09-12 ENCOUNTER — APPOINTMENT (OUTPATIENT)
Dept: CARDIAC REHAB | Facility: CLINIC | Age: 65
End: 2025-09-12
Payer: COMMERCIAL

## 2025-09-15 ENCOUNTER — APPOINTMENT (OUTPATIENT)
Dept: CARDIAC REHAB | Facility: CLINIC | Age: 65
End: 2025-09-15
Payer: COMMERCIAL